# Patient Record
Sex: MALE | Race: WHITE | Employment: FULL TIME | URBAN - METROPOLITAN AREA
[De-identification: names, ages, dates, MRNs, and addresses within clinical notes are randomized per-mention and may not be internally consistent; named-entity substitution may affect disease eponyms.]

---

## 2017-05-02 PROBLEM — E11.9 CONTROLLED TYPE 2 DIABETES MELLITUS WITHOUT COMPLICATION, WITHOUT LONG-TERM CURRENT USE OF INSULIN (HCC): Status: ACTIVE | Noted: 2017-05-02

## 2018-10-22 PROBLEM — E66.01 SEVERE OBESITY (HCC): Status: ACTIVE | Noted: 2018-10-22

## 2020-12-07 ENCOUNTER — HOSPITAL ENCOUNTER (OUTPATIENT)
Dept: LAB | Age: 70
Discharge: HOME | End: 2020-12-07

## 2020-12-07 PROCEDURE — 88305 TISSUE EXAM BY PATHOLOGIST: CPT

## 2022-03-18 PROBLEM — E11.9 CONTROLLED TYPE 2 DIABETES MELLITUS WITHOUT COMPLICATION, WITHOUT LONG-TERM CURRENT USE OF INSULIN (HCC): Status: ACTIVE | Noted: 2017-05-02

## 2022-03-19 PROBLEM — E66.01 SEVERE OBESITY (HCC): Status: ACTIVE | Noted: 2018-10-22

## 2022-05-31 DIAGNOSIS — E78.2 MIXED HYPERLIPIDEMIA: ICD-10-CM

## 2022-05-31 DIAGNOSIS — E11.9 CONTROLLED TYPE 2 DIABETES MELLITUS WITHOUT COMPLICATION, WITHOUT LONG-TERM CURRENT USE OF INSULIN (HCC): Primary | ICD-10-CM

## 2022-05-31 RX ORDER — INSULIN GLARGINE 300 U/ML
INJECTION, SOLUTION SUBCUTANEOUS
Qty: 4.5 ML | Refills: 11 | OUTPATIENT
Start: 2022-05-31

## 2022-05-31 RX ORDER — PEN NEEDLE, DIABETIC 32GX 5/32"
NEEDLE, DISPOSABLE MISCELLANEOUS
Qty: 100 EACH | Refills: 11 | OUTPATIENT
Start: 2022-05-31

## 2022-06-01 RX ORDER — PEN NEEDLE, DIABETIC 32GX 5/32"
NEEDLE, DISPOSABLE MISCELLANEOUS
Qty: 100 EACH | Refills: 11 | OUTPATIENT
Start: 2022-06-01

## 2022-06-01 RX ORDER — INSULIN GLARGINE 300 U/ML
INJECTION, SOLUTION SUBCUTANEOUS
Qty: 4.5 ML | Refills: 11 | OUTPATIENT
Start: 2022-06-01

## 2022-06-02 RX ORDER — INSULIN GLARGINE 300 U/ML
INJECTION, SOLUTION SUBCUTANEOUS
Qty: 4.5 ML | Refills: 11 | OUTPATIENT
Start: 2022-06-02

## 2022-06-02 RX ORDER — SEMAGLUTIDE 1.34 MG/ML
INJECTION, SOLUTION SUBCUTANEOUS
Qty: 3 ML | Refills: 3 | OUTPATIENT
Start: 2022-06-02

## 2022-06-03 RX ORDER — SEMAGLUTIDE 1.34 MG/ML
INJECTION, SOLUTION SUBCUTANEOUS
Qty: 3 ML | Refills: 3 | OUTPATIENT
Start: 2022-06-03

## 2022-06-03 RX ORDER — INSULIN GLARGINE 300 U/ML
INJECTION, SOLUTION SUBCUTANEOUS
Qty: 4.5 ML | Refills: 11 | OUTPATIENT
Start: 2022-06-03

## 2022-06-16 RX ORDER — SEMAGLUTIDE 1.34 MG/ML
INJECTION, SOLUTION SUBCUTANEOUS
Qty: 3 ML | Refills: 9 | OUTPATIENT
Start: 2022-06-16

## 2022-06-17 ENCOUNTER — TELEPHONE (OUTPATIENT)
Dept: ENDOCRINOLOGY | Age: 72
End: 2022-06-17

## 2022-06-20 RX ORDER — SEMAGLUTIDE 1.34 MG/ML
1 INJECTION, SOLUTION SUBCUTANEOUS
Qty: 9 ML | Refills: 3 | Status: SHIPPED | OUTPATIENT
Start: 2022-06-20 | End: 2022-08-24 | Stop reason: DRUGHIGH

## 2022-06-21 RX ORDER — METFORMIN HYDROCHLORIDE 500 MG/1
TABLET, EXTENDED RELEASE ORAL
Qty: 120 TABLET | Refills: 11 | OUTPATIENT
Start: 2022-06-21

## 2022-06-21 RX ORDER — FENOFIBRATE 145 MG/1
TABLET, COATED ORAL
Qty: 30 TABLET | Refills: 11 | OUTPATIENT
Start: 2022-06-21

## 2022-08-18 LAB
AVERAGE GLUCOSE: ABNORMAL
HBA1C MFR BLD: 7 %

## 2022-08-24 ENCOUNTER — OFFICE VISIT (OUTPATIENT)
Dept: ENDOCRINOLOGY | Age: 72
End: 2022-08-24
Payer: MEDICARE

## 2022-08-24 VITALS
WEIGHT: 255 LBS | SYSTOLIC BLOOD PRESSURE: 140 MMHG | HEIGHT: 71 IN | HEART RATE: 71 BPM | DIASTOLIC BLOOD PRESSURE: 74 MMHG | OXYGEN SATURATION: 95 % | BODY MASS INDEX: 35.7 KG/M2

## 2022-08-24 DIAGNOSIS — E11.65 UNCONTROLLED TYPE 2 DIABETES MELLITUS WITH HYPERGLYCEMIA, WITH LONG-TERM CURRENT USE OF INSULIN (HCC): Primary | ICD-10-CM

## 2022-08-24 DIAGNOSIS — Z79.4 UNCONTROLLED TYPE 2 DIABETES MELLITUS WITH HYPERGLYCEMIA, WITH LONG-TERM CURRENT USE OF INSULIN (HCC): Primary | ICD-10-CM

## 2022-08-24 DIAGNOSIS — E78.2 MIXED HYPERLIPIDEMIA: ICD-10-CM

## 2022-08-24 PROCEDURE — G8419 CALC BMI OUT NRM PARAM NOF/U: HCPCS | Performed by: INTERNAL MEDICINE

## 2022-08-24 PROCEDURE — 99214 OFFICE O/P EST MOD 30 MIN: CPT | Performed by: INTERNAL MEDICINE

## 2022-08-24 PROCEDURE — 1123F ACP DISCUSS/DSCN MKR DOCD: CPT | Performed by: INTERNAL MEDICINE

## 2022-08-24 PROCEDURE — 4004F PT TOBACCO SCREEN RCVD TLK: CPT | Performed by: INTERNAL MEDICINE

## 2022-08-24 PROCEDURE — 3017F COLORECTAL CA SCREEN DOC REV: CPT | Performed by: INTERNAL MEDICINE

## 2022-08-24 PROCEDURE — 2022F DILAT RTA XM EVC RTNOPTHY: CPT | Performed by: INTERNAL MEDICINE

## 2022-08-24 PROCEDURE — G8427 DOCREV CUR MEDS BY ELIG CLIN: HCPCS | Performed by: INTERNAL MEDICINE

## 2022-08-24 PROCEDURE — 3051F HG A1C>EQUAL 7.0%<8.0%: CPT | Performed by: INTERNAL MEDICINE

## 2022-08-24 RX ORDER — GLIMEPIRIDE 1 MG/1
1 TABLET ORAL 2 TIMES DAILY WITH MEALS
Qty: 60 TABLET | Refills: 11 | Status: SHIPPED | OUTPATIENT
Start: 2022-08-24

## 2022-08-24 RX ORDER — LANCETS 30 GAUGE
EACH MISCELLANEOUS
Qty: 100 EACH | Refills: 11 | Status: SHIPPED | OUTPATIENT
Start: 2022-08-24

## 2022-08-24 RX ORDER — SEMAGLUTIDE 2.68 MG/ML
2 INJECTION, SOLUTION SUBCUTANEOUS WEEKLY
Qty: 3 ML | Refills: 11 | Status: SHIPPED | OUTPATIENT
Start: 2022-08-24

## 2022-08-24 RX ORDER — METFORMIN HYDROCHLORIDE 500 MG/1
1000 TABLET, EXTENDED RELEASE ORAL 2 TIMES DAILY WITH MEALS
Qty: 120 TABLET | Refills: 11 | Status: SHIPPED | OUTPATIENT
Start: 2022-08-24

## 2022-08-24 RX ORDER — FENOFIBRATE 145 MG/1
145 TABLET, COATED ORAL DAILY
Qty: 30 TABLET | Refills: 11 | Status: SHIPPED | OUTPATIENT
Start: 2022-08-24

## 2022-08-24 RX ORDER — PEN NEEDLE, DIABETIC 32GX 5/32"
NEEDLE, DISPOSABLE MISCELLANEOUS
Qty: 50 EACH | Refills: 11 | Status: SHIPPED | OUTPATIENT
Start: 2022-08-24

## 2022-08-24 RX ORDER — EZETIMIBE 10 MG/1
10 TABLET ORAL DAILY
Qty: 30 TABLET | Refills: 11 | Status: SHIPPED | OUTPATIENT
Start: 2022-08-24

## 2022-08-24 RX ORDER — CLINDAMYCIN HYDROCHLORIDE 150 MG/1
CAPSULE ORAL
COMMUNITY
Start: 2022-08-16

## 2022-08-24 RX ORDER — INSULIN GLARGINE 300 U/ML
40 INJECTION, SOLUTION SUBCUTANEOUS NIGHTLY
Qty: 4.5 ML | Refills: 11 | Status: SHIPPED | OUTPATIENT
Start: 2022-08-24

## 2022-08-24 RX ORDER — BLOOD SUGAR DIAGNOSTIC
STRIP MISCELLANEOUS
Qty: 100 EACH | Refills: 11 | Status: SHIPPED | OUTPATIENT
Start: 2022-08-24

## 2022-08-24 RX ORDER — ICOSAPENT ETHYL 1000 MG/1
2 CAPSULE ORAL 2 TIMES DAILY
Qty: 120 CAPSULE | Refills: 11 | Status: SHIPPED | OUTPATIENT
Start: 2022-08-24

## 2022-08-24 ASSESSMENT — ENCOUNTER SYMPTOMS: SHORTNESS OF BREATH: 1

## 2022-08-24 NOTE — PROGRESS NOTES
Angelito Oconnor MD, [facility]  [address], [address] [facility], [address] [place] [address]  Phone [phone]  Facsimile [phone]          Isha Lopez is a 81 y.o. male seen 8/24/2022 for follow-up of type 2 diabetes mellitus           ASSESSMENT AND PLAN:    1. Uncontrolled type 2 diabetes mellitus with hyperglycemia, with long-term current use of insulin (HCC)  His glycemic control is suboptimal and his hemoglobin A1c is not at goal less than 7. I will maximize his Ozempic as below. Eye exam negative 4/2022. Urine microalbumin negative 8/2022.      - TOUJEO SOLOSTAR 300 UNIT/ML SOPN; Inject 40 Units into the skin at bedtime  Dispense: 4.5 mL; Refill: 11  - OZEMPIC, 2 MG/DOSE, 8 MG/3ML SOPN; Inject 2 mg into the skin once a week  Dispense: 3 mL; Refill: 11  - metFORMIN (GLUCOPHAGE-XR) 500 MG extended release tablet; Take 2 tablets by mouth 2 times daily (with meals)  Dispense: 120 tablet; Refill: 11  - glimepiride (AMARYL) 1 MG tablet; Take 1 tablet by mouth 2 times daily (with meals)  Dispense: 60 tablet; Refill: 11  - BD PEN NEEDLE JESUS 2ND GEN 32G X 4 MM MISC; Once a day  Dispense: 50 each; Refill: 11  - ONETOUCH VERIO strip; 2 times a day  Dispense: 100 each; Refill: 11  - OneTouch Delica Lancets 41Y MISC; 2 times a day  Dispense: 100 each; Refill: 11    2. Mixed hyperlipidemia  He has been intolerant of multiple statins as outlined below. His insurance would not cover Nexletol or Nexlizet. His LDL is not at goal.  His triglycerides remain elevated despite fenofibrate, Vascepa and improved glycemic control.    - fenofibrate (TRICOR) 145 MG tablet; Take 1 tablet by mouth daily  Dispense: 30 tablet; Refill: 11  - VASCEPA 1 g CAPS capsule; Take 2 capsules by mouth 2 times daily  Dispense: 120 capsule; Refill: 11  - ezetimibe (ZETIA) 10 MG tablet; Take 1 tablet by mouth daily  Dispense: 30 tablet;  Refill: 11      Follow-up and Dispositions    Return in about 4 months (around 12/24/2022). HISTORY OF PRESENT ILLNESS:    DIABETES MELLITUS     Diagnosis: Type 2 diabetes mellitus diagnosed 2014. Diet: He eats a snack at night (carrots, peanuts). No sweet tea or regular soft drinks. He tries to limit carbohydrates. Exercise: No regular exercise but active at work. Monitoring: [employer] - 1-2 times per day. Blood Glucose: By recall: Fasting , evening 100-120. Hypoglycemia: No recent lows. Hemoglobin A1c:  3/11/2016: 7.8.  6/24/2016: 9.0.  9/30/2016: 9.8.  1/6/2017: 7.3.  4/6/2017: 6.7.  10/23/2017: 6.4.  4/19/2018: 7.1.  8/7/2018: 8.4.  10/22/2018: 8.5.  2/8/2019: 8.6.  5/14/2019: 8.5.  11/21/2019: 7.8.  9/15/2020: 6.5.  1/27/2021: 6.3.  5/21/2021: 6.9.  10/4/2021: 7.1.  2/14/2022: 6.8.  8/18/2022: 7.0. Microalbumin/Nephropathy:  3/11/2016: Creatinine 0.8 (GFR >90), urine microalbumin/creatinine 7.  6/24/2016: Creatinine 0.9 (GFR 85). 9/30/2016: Creatinine 0.9 (GFR 85). 1/6/2017: Creatinine 1.1 (GFR 67). 1/25/2017: Urine microalbumin/creatinine <8.8.  4/6/2017: Creatinine 1.1 (GFR 67), urine microalbumin/creatinine <5.  10/23/2017: Creatinine 1.1 (GFR 67).  4/19/2018: Creatinine 1.0 (GFR >60). 5/2/2018: Urine microalbumin/creatinine <4.9.  8/7/2018: Creatinine 0.9 (GFR 84). 2/8/2019: Creatinine 1.15 (GFR 66), urine microalbumin/creatinine 8.  3/26/2020: Urine microalbumin/creatinine 36.  9/15/2020: Creatinine 1.40 (GFR 51). 5/21/2021: Creatinine 1.40 (GFR 31), urine microalbumin/creatinine 9.  2/14/2022: Creatinine 1.33 (GFR 54). 8/18/2022: Creatinine 1.33 (GFR 58), urine albumin/creatinine 7. Neuropathy: Denies burning, numbness, tingling of feet. Retinopathy: Eye exam 4/2022 - no retinopathy. Lipids: Gemfibrozil stopped due to arthralgias. He was having some myalgias in his hips on pravastatin. His cardiologist changed him to atorvastatin. He is also taking fenofibrate.   His PCP changed him from atorvastatin to rosuvastatin due to myalgias. He stopped rosuvastatin due to cramps. His insurance would not cover Nexletol or Nexlizet. 3/11/2016: Total cholesterol 295, triglycerides 1618, HDL 35.   6/24/2016: Total cholesterol 238, triglycerides 399, HDL 35.  9/30/2016: Total cholesterol 259, triglycerides 684, HDL 42.  1/6/2017: Total cholesterol 209, triglycerides 330, HDL 45, LDL 98.  4/6/2017: Total cholesterol 194, triglycerides 390, HDL 41, LDL 75.  10/23/2017: Total cholesterol 162, triglycerides 264, HDL 41, LDL 68.  4/19/2018: Total cholesterol 172, triglycerides 232, HDL 40, LDL 86.  8/7/2018: Total cholesterol 157, triglycerides 284, HDL 41, LDL 59.  2/8/2019: Total cholesterol 154, triglycerides 194, HDL 41, LDL 74, VLDL 39.  5/14/2019: Total cholesterol 175, triglycerides 298, HDL 37, LDL 78, VLDL 60.  11/21/2019: Total cholesterol 160, triglycerides 352, HDL 44, LDL 46, VLDL 70.  9/15/2020: Total cholesterol 237, triglycerides 448, HDL 40.  5/21/2021: Total cholesterol 240, triglycerides 391, HDL 40, , VLDL 70.  10/4/2021: Total cholesterol 245, triglycerides 290, HDL 45, , VLDL 53.  2/14/2022: Total cholesterol 264, triglycerides 253, HDL 46, , VLDL 48.  8/18/2022: Total cholesterol 201, triglycerides 244, HDL 43, , VLDL 43. TSH:  3/11/2016: TSH 1.611.  5/21/2021: TSH 3.640.  8/18/2022: TSH 2.690. Prior treatment: He stopped [place] due to frequent urination and bladder spasms. Normal    Review of Systems   Constitutional:         Weight increased 6 pounds since last visit. Respiratory:  Positive for shortness of breath (occasionally). Cardiovascular:  Negative for chest pain.      Vital Signs:  BP (!) 140/74   Pulse 71   Ht 5' 11\" (1.803 m)   Wt 255 lb (115.7 kg)   SpO2 95%   BMI 35.57 kg/m²     Wt Readings from Last 3 Encounters:   08/24/22 255 lb (115.7 kg)   02/22/22 249 lb (112.9 kg)   05/27/21 258 lb (117 kg)       Physical

## 2022-12-19 ENCOUNTER — OFFICE VISIT (OUTPATIENT)
Dept: INTERNAL MEDICINE CLINIC | Facility: CLINIC | Age: 72
End: 2022-12-19
Payer: MEDICARE

## 2022-12-19 VITALS
TEMPERATURE: 97.2 F | HEIGHT: 71 IN | RESPIRATION RATE: 16 BRPM | OXYGEN SATURATION: 98 % | BODY MASS INDEX: 34.72 KG/M2 | DIASTOLIC BLOOD PRESSURE: 80 MMHG | HEART RATE: 63 BPM | SYSTOLIC BLOOD PRESSURE: 146 MMHG | WEIGHT: 248 LBS

## 2022-12-19 DIAGNOSIS — K42.9 UMBILICAL HERNIA WITHOUT OBSTRUCTION AND WITHOUT GANGRENE: Primary | ICD-10-CM

## 2022-12-19 DIAGNOSIS — E66.01 SEVERE OBESITY (HCC): ICD-10-CM

## 2022-12-19 DIAGNOSIS — R10.31 BILATERAL GROIN PAIN: ICD-10-CM

## 2022-12-19 DIAGNOSIS — M25.559 HIP PAIN: ICD-10-CM

## 2022-12-19 DIAGNOSIS — R20.0 BILATERAL HAND NUMBNESS: ICD-10-CM

## 2022-12-19 DIAGNOSIS — M62.08 DIASTASIS RECTI: ICD-10-CM

## 2022-12-19 DIAGNOSIS — E11.65 UNCONTROLLED TYPE 2 DIABETES MELLITUS WITH HYPERGLYCEMIA, WITHOUT LONG-TERM CURRENT USE OF INSULIN (HCC): ICD-10-CM

## 2022-12-19 DIAGNOSIS — I10 PRIMARY HYPERTENSION: ICD-10-CM

## 2022-12-19 DIAGNOSIS — R10.32 BILATERAL GROIN PAIN: ICD-10-CM

## 2022-12-19 DIAGNOSIS — E78.1 HYPERTRIGLYCERIDEMIA: ICD-10-CM

## 2022-12-19 PROCEDURE — 3051F HG A1C>EQUAL 7.0%<8.0%: CPT | Performed by: INTERNAL MEDICINE

## 2022-12-19 PROCEDURE — G8484 FLU IMMUNIZE NO ADMIN: HCPCS | Performed by: INTERNAL MEDICINE

## 2022-12-19 PROCEDURE — 3017F COLORECTAL CA SCREEN DOC REV: CPT | Performed by: INTERNAL MEDICINE

## 2022-12-19 PROCEDURE — 3077F SYST BP >= 140 MM HG: CPT | Performed by: INTERNAL MEDICINE

## 2022-12-19 PROCEDURE — 1123F ACP DISCUSS/DSCN MKR DOCD: CPT | Performed by: INTERNAL MEDICINE

## 2022-12-19 PROCEDURE — 1036F TOBACCO NON-USER: CPT | Performed by: INTERNAL MEDICINE

## 2022-12-19 PROCEDURE — 2022F DILAT RTA XM EVC RTNOPTHY: CPT | Performed by: INTERNAL MEDICINE

## 2022-12-19 PROCEDURE — G8417 CALC BMI ABV UP PARAM F/U: HCPCS | Performed by: INTERNAL MEDICINE

## 2022-12-19 PROCEDURE — 3078F DIAST BP <80 MM HG: CPT | Performed by: INTERNAL MEDICINE

## 2022-12-19 PROCEDURE — G8427 DOCREV CUR MEDS BY ELIG CLIN: HCPCS | Performed by: INTERNAL MEDICINE

## 2022-12-19 PROCEDURE — 99203 OFFICE O/P NEW LOW 30 MIN: CPT | Performed by: INTERNAL MEDICINE

## 2022-12-19 ASSESSMENT — PATIENT HEALTH QUESTIONNAIRE - PHQ9
SUM OF ALL RESPONSES TO PHQ QUESTIONS 1-9: 0
SUM OF ALL RESPONSES TO PHQ QUESTIONS 1-9: 0
SUM OF ALL RESPONSES TO PHQ9 QUESTIONS 1 & 2: 0
2. FEELING DOWN, DEPRESSED OR HOPELESS: 0
SUM OF ALL RESPONSES TO PHQ QUESTIONS 1-9: 0
1. LITTLE INTEREST OR PLEASURE IN DOING THINGS: 0
SUM OF ALL RESPONSES TO PHQ QUESTIONS 1-9: 0

## 2022-12-19 NOTE — PROGRESS NOTES
12/19/2022   Location:[facility] [address] [facility]  [place]  Patient #:  [id number]  YOB: 1950        History of Present Illness     Chief Complaint   Patient presents with    New Patient     Establishing care    Numbness     In hands    Fatigue    Hernia     X 4 year worse in the past year    Abdominal Pain     Right flank happen while standing still for a long period of time. Sitting will resolve the pain       Mr. Adán Fish is a 81 y.o. male  who presents for visit to establish care. Chronic active medical issues DM, HTN, HLD reports tolerance to meds and compliance. He main complaint today is growing umbilical hernia . He does report hearing gurgling in the hernia. There is no pain. Is never firm. He also reports episodes of right flank pain after standing for a long period of time. Only happens when he is standing in 1 place. Does not happen with activity. There is no pain with pressure. Feels like a spasm and he has to go sit down in in a matter of minutes it will resolve. He can feel a tight knot in the muscle. Denies any nausea vomiting or any other associated symptoms. Denies any trauma to the area. Complains of pain in his groin with long ambulation. He is under the care of endocrinologist for his diabetes.    He is under the care of pulmonologist for SREE  Last Labs  CBC: No results found for: WBC, RBC, HGB, HCT, MCV, MCH, MCHC, RDW, PLT, MPV  CMP:    Lab Results   Component Value Date/Time     02/14/2022 11:14 AM    K 5.3 02/14/2022 11:14 AM     02/14/2022 11:14 AM    CO2 22 02/14/2022 11:14 AM    BUN 21 02/14/2022 11:14 AM    CREATININE 1.33 02/14/2022 11:14 AM    GFRAA 63 02/14/2022 11:14 AM    AGRATIO 2.4 02/14/2022 11:14 AM    GLUCOSE 101 02/14/2022 11:14 AM    PROT 6.8 02/14/2022 11:14 AM    LABALBU 4.8 02/14/2022 11:14 AM    CALCIUM 9.4 02/14/2022 11:14 AM    BILITOT 0.4 02/14/2022 11:14 AM    ALKPHOS 57 02/14/2022 11:14 AM AST 19 02/14/2022 11:14 AM    ALT 27 02/14/2022 11:14 AM     HgBA1c:    Lab Results   Component Value Date/Time    LABA1C 7.0 08/18/2022 12:00 AM     Microalbumen/Creatinine ratio:  No components found for: RUCREAT  FLP:    Lab Results   Component Value Date/Time    TRIG 253 02/14/2022 11:14 AM    HDL 46 02/14/2022 11:14 AM    LDLCALC 170 02/14/2022 11:14 AM     TSH:    Lab Results   Component Value Date/Time    TSH 3.640 05/21/2021 08:55 AM       Allergies   Allergen Reactions    Penicillins Hives     Past Medical History:   Diagnosis Date    Chest pain 3/28/2016    Arm heaviness with ambulation     Dyspnea     Hyperlipidemia     Hypertriglyceridemia 7/21/2016    Myalgia 4/25/2016    Sleep apnea     Type 2 diabetes mellitus ([facility] [address]      Social History     Socioeconomic History    Marital status:      Spouse name: None    Number of children: None    Years of education: None    Highest education level: None   Tobacco Use    Smoking status: Never    Smokeless tobacco: Never   Vaping Use    Vaping Use: Never used   Substance and Sexual Activity    Alcohol use: No    Drug use: Never     Past Surgical History:   Procedure Laterality Date    COLONOSCOPY  2019    LITHOTRIPSY  11/2018    TONSILLECTOMY AND ADENOIDECTOMY      WISDOM TOOTH EXTRACTION       Family History   Problem Relation Age of Onset    Cancer Mother     Dementia Mother     Cancer Father     Dementia Father     Coronary Art Dis Paternal Aunt 72    Heart Disease Paternal Uncle     Diabetes Paternal Uncle     Hypertension Other     Stroke Other      Current Outpatient Medications   Medication Sig Dispense Refill    Misc Natural Products (NEURIVA PO) Take by mouth      TOUJEO SOLOSTAR 300 UNIT/ML SOPN Inject 40 Units into the skin at bedtime 4.5 mL 11    OZEMPIC, 2 MG/DOSE, 8 MG/3ML SOPN Inject 2 mg into the skin once a week 3 mL 11    metFORMIN (GLUCOPHAGE-XR) 500 MG extended release tablet Take 2 tablets by mouth 2 times daily (with meals) 120 tablet 11    glimepiride (AMARYL) 1 MG tablet Take 1 tablet by mouth 2 times daily (with meals) 60 tablet 11    BD PEN NEEDLE JESUS 2ND GEN 32G X 4 MM MISC Once a day 50 each 11    ONETOUCH VERIO strip 2 times a day 100 each 11    OneTouch Delica Lancets 69P MISC 2 times a day 100 each 11    fenofibrate (TRICOR) 145 MG tablet Take 1 tablet by mouth daily 30 tablet 11    VASCEPA 1 g CAPS capsule Take 2 capsules by mouth 2 times daily 120 capsule 11    ezetimibe (ZETIA) 10 MG tablet Take 1 tablet by mouth daily 30 tablet 11    GARLIC PO Take by mouth      coenzyme Q10 100 MG CAPS capsule Take 200 mg by mouth daily       No current facility-administered medications for this visit. Health Maintenance   Topic Date Due    Pneumococcal 65+ years Vaccine (1 - PCV) Never done    Diabetic foot exam  Never done    Depression Screen  Never done    Hepatitis C screen  Never done    Colorectal Cancer Screen  Never done    Shingles vaccine (1 of 2) Never done    COVID-19 Vaccine (3 - Booster for Amy Carwin series) 05/22/2021    Annual Wellness Visit (AWV)  Never done    Flu vaccine (1) Never done    Diabetic retinal exam  04/11/2023    A1C test (Diabetic or Prediabetic)  08/18/2023    Diabetic microalbuminuria test  08/18/2023    Lipids  08/18/2023    DTaP/Tdap/Td vaccine (2 - Td or Tdap) 08/18/2028    Hepatitis A vaccine  Aged Out    Hib vaccine  Aged Out    Meningococcal (ACWY) vaccine  Aged Out             Review of Systems  Review of Systems   Constitutional:  Negative for fever. Eyes:  Negative for visual disturbance. Respiratory:  Negative for cough and shortness of breath. Gastrointestinal:  Negative for abdominal pain, blood in stool, constipation and diarrhea. Genitourinary:  Negative for difficulty urinating. Musculoskeletal:  Positive for arthralgias and myalgias. Neurological:  Positive for numbness (hands). Negative for weakness and headaches.      BP (!) 151/81 (Site: Left Upper Arm, Position: Sitting)   Pulse 63   Temp 97.2 °F (36.2 °C) (Temporal)   Resp 16   Ht 5' 11\" (1.803 m)   Wt 248 lb (112.5 kg)   SpO2 98%   BMI 34.59 kg/m²     Wt Readings from Last 3 Encounters:   12/19/22 248 lb (112.5 kg)   08/24/22 255 lb (115.7 kg)   02/22/22 249 lb (112.9 kg)       Physical Exam    Physical Exam  Vitals and nursing note reviewed. Constitutional:       Appearance: Normal appearance. He is obese. HENT:      Head: Atraumatic. Mouth/Throat:      Mouth: Mucous membranes are moist.   Eyes:      Extraocular Movements: Extraocular movements intact. Conjunctiva/sclera: Conjunctivae normal.      Pupils: Pupils are equal, round, and reactive to light. Neck:      Vascular: No carotid bruit. Cardiovascular:      Rate and Rhythm: Normal rate and regular rhythm. Heart sounds: Normal heart sounds. Pulmonary:      Effort: Pulmonary effort is normal.      Breath sounds: Normal breath sounds. Abdominal:      General: Bowel sounds are normal.      Palpations: Abdomen is soft. Tenderness: There is no abdominal tenderness. Hernia: A hernia is present. Hernia is present in the umbilical area. Comments: Diastasis recti with umbilical hernia. Hernia is reducible with audible BS   Musculoskeletal:      Right hip: Tenderness present. Decreased range of motion. Normal strength. Left hip: Tenderness present. Decreased range of motion. Normal strength. Feet:      Comments: Diabetic foot exam:   Left Foot:   Visual Exam: callous-    Pulse DP: 2+ (normal)   Filament test: reduced sensation   Vibratory Sensation: diminished  Right Foot:   Visual Exam: callous-      Pulse DP: 2+ (normal)   Filament test: reduced sensation   Vibratory Sensation: normal    Lymphadenopathy:      Cervical: No cervical adenopathy. Skin:     General: Skin is warm and dry. Neurological:      Mental Status: He is alert and oriented to person, place, and time. Cranial Nerves: No cranial nerve deficit.       Gait: Gait normal.   Psychiatric:         Mood and Affect: Mood normal.           Assessment & Plan    Encounter Diagnoses   Name Primary? Umbilical hernia without obstruction and without gangrene Yes    Severe obesity (HCC)     Hypertriglyceridemia     Bilateral hand numbness     Bilateral groin pain     Hip pain     Diastasis recti        No orders of the defined types were placed in this encounter. Orders Placed This Encounter   Procedures    XR HIP LEFT (2-3 VIEWS)     Standing Status:   Future     Standing Expiration Date:   12/19/2023     Order Specific Question:   Reason for exam:     Answer:   groin pain with walking    XR HIP RIGHT (2-3 VIEWS)     Standing Status:   Future     Standing Expiration Date:   12/19/2023     Order Specific Question:   Reason for exam:     Answer:   groin pain when  walking    [address] [facility], [address] [place] [address]     Referral Priority:   Routine     Referral Type:   Eval and Treat     Referral Reason:   Specialty Services Required     Requested Specialty:   General Surgery     Number of Visits Requested:   1       The patient is asked to make an attempt to improve diet and exercise patterns to aid in medical management of this problem. Monitor BP at home. Check xray of hps likely OA  Discussed having NCS to evaluate for CTS. Return in about 6 weeks (around 1/30/2023) for re evaluation of hypertension, fasting labs.         Jb Long MD

## 2022-12-22 ENCOUNTER — PREP FOR PROCEDURE (OUTPATIENT)
Dept: SURGERY | Age: 72
End: 2022-12-22

## 2022-12-22 ENCOUNTER — OFFICE VISIT (OUTPATIENT)
Dept: SURGERY | Age: 72
End: 2022-12-22
Payer: MEDICARE

## 2022-12-22 ENCOUNTER — TELEPHONE (OUTPATIENT)
Dept: INTERNAL MEDICINE CLINIC | Facility: CLINIC | Age: 72
End: 2022-12-22

## 2022-12-22 ENCOUNTER — HOSPITAL ENCOUNTER (OUTPATIENT)
Dept: GENERAL RADIOLOGY | Age: 72
Discharge: HOME | End: 2022-12-22
Payer: MEDICARE

## 2022-12-22 VITALS — HEIGHT: 71 IN | BODY MASS INDEX: 35 KG/M2 | WEIGHT: 250 LBS

## 2022-12-22 DIAGNOSIS — R10.32 BILATERAL GROIN PAIN: ICD-10-CM

## 2022-12-22 DIAGNOSIS — R10.31 BILATERAL GROIN PAIN: ICD-10-CM

## 2022-12-22 DIAGNOSIS — K42.9 UMBILICAL HERNIA WITHOUT OBSTRUCTION AND WITHOUT GANGRENE: ICD-10-CM

## 2022-12-22 DIAGNOSIS — M25.559 HIP PAIN: ICD-10-CM

## 2022-12-22 PROCEDURE — 73521 X-RAY EXAM HIPS BI 2 VIEWS: CPT

## 2022-12-22 PROCEDURE — 1123F ACP DISCUSS/DSCN MKR DOCD: CPT | Performed by: SURGERY

## 2022-12-22 PROCEDURE — 99202 OFFICE O/P NEW SF 15 MIN: CPT | Performed by: SURGERY

## 2022-12-22 PROCEDURE — 1036F TOBACCO NON-USER: CPT | Performed by: SURGERY

## 2022-12-22 PROCEDURE — G8484 FLU IMMUNIZE NO ADMIN: HCPCS | Performed by: SURGERY

## 2022-12-22 PROCEDURE — G8417 CALC BMI ABV UP PARAM F/U: HCPCS | Performed by: SURGERY

## 2022-12-22 PROCEDURE — 3017F COLORECTAL CA SCREEN DOC REV: CPT | Performed by: SURGERY

## 2022-12-22 PROCEDURE — G8427 DOCREV CUR MEDS BY ELIG CLIN: HCPCS | Performed by: SURGERY

## 2022-12-22 ASSESSMENT — ENCOUNTER SYMPTOMS
BLOOD IN STOOL: 0
CONSTIPATION: 0
ABDOMINAL PAIN: 0
DIARRHEA: 0
COUGH: 0
SHORTNESS OF BREATH: 0

## 2022-12-22 NOTE — PROGRESS NOTES
[facility]  [facility]. [address] [place] [address]  [phone]    Office Note/H&P/Consult Note   Maximiliano Lu   MRN: [id number]     : 1950        HPI: Maximiliano Lu is a 81 y.o. male who is here to see me today with an umbilical hernia. This is been present for many years. It is increasing in size. It causes him no pain whatsoever. No change in bowel habits. No previous surgery in this area. It is becoming unsightly and getting in the way of his clothes etc. so he would like to have this repaired. Past Medical History:   Diagnosis Date    Chest pain 3/28/2016    Arm heaviness with ambulation     Dyspnea     Hyperlipidemia     Hypertriglyceridemia 2016    Myalgia 2016    Sleep apnea     Type 2 diabetes mellitus [facility])      Past Surgical History:   Procedure Laterality Date    COLONOSCOPY  2019    LITHOTRIPSY  2018    TONSILLECTOMY AND ADENOIDECTOMY      WISDOM TOOTH EXTRACTION       Current Outpatient Medications   Medication Sig    Misc Natural Products (NEURIVA PO) Take by mouth    TOUJEO SOLOSTAR 300 UNIT/ML SOPN Inject 40 Units into the skin at bedtime    OZEMPIC, 2 MG/DOSE, 8 MG/3ML SOPN Inject 2 mg into the skin once a week    metFORMIN (GLUCOPHAGE-XR) 500 MG extended release tablet Take 2 tablets by mouth 2 times daily (with meals)    glimepiride (AMARYL) 1 MG tablet Take 1 tablet by mouth 2 times daily (with meals)    BD PEN NEEDLE JESUS 2ND GEN 32G X 4 MM MISC Once a day    ONETOUCH VERIO strip 2 times a day    OneTouch Delica Lancets 19N MISC 2 times a day    fenofibrate (TRICOR) 145 MG tablet Take 1 tablet by mouth daily    VASCEPA 1 g CAPS capsule Take 2 capsules by mouth 2 times daily    ezetimibe (ZETIA) 10 MG tablet Take 1 tablet by mouth daily    GARLIC PO Take by mouth    coenzyme Q10 100 MG CAPS capsule Take 200 mg by mouth daily     No current facility-administered medications for this visit.      ALLERGIES: Penicillins    Social History     Socioeconomic History    Marital status:      Spouse name: None    Number of children: None    Years of education: None    Highest education level: None   Tobacco Use    Smoking status: Never    Smokeless tobacco: Never   Vaping Use    Vaping Use: Never used   Substance and Sexual Activity    Alcohol use: No    Drug use: Never     Social History     Tobacco Use   Smoking Status Never   Smokeless Tobacco Never     Family History   Problem Relation Age of Onset    Cancer Mother     Dementia Mother     Cancer Father     Dementia Father     Coronary Art Dis Paternal Aunt 72    Heart Disease Paternal Uncle     Diabetes Paternal Uncle     Hypertension Other     Stroke Other        ROS: The patient has no difficulty with chest pain or shortness of breath. No fever or chills. Comprehensive review of systems was otherwise unremarkable except as noted above. Physical Exam:   Constitutional: Alert oriented cooperative patient in no acute distress. Ht 5' 11\" (1.803 m)   Wt 250 lb (113.4 kg)   BMI 34.87 kg/m²   Eyes:Sclera are clear, pupils symmetric   ENMT: ears have no obvious external lesions; no obvious neck masses; no lip lesions  CV: RRR. Normal perfusion; no embolic signs  Resp: No JVD. Breathing is  non-labored. No audible wheezing   GI: soft and non-distended. Large umbilical hernia is present which is reducible. Musculoskeletal: no significant asymmetry; normal tone; unremarkable with normal function. Neuro:  No obvious focal deficits; moves all 4; A&O x3  Psychiatric: normal affect and mood, no memory impairment      Labs:  Lab Results   Component Value Date/Time     02/14/2022 11:14 AM    K 5.3 02/14/2022 11:14 AM     02/14/2022 11:14 AM    CO2 22 02/14/2022 11:14 AM    BUN 21 02/14/2022 11:14 AM    ALT 27 02/14/2022 11:14 AM       No results found for this or any previous visit.     I reviewed patient's medications, labs, and independently reviewed relevant radiologic studies if available. Labs/radiology studies as ordered in [facility] or in scanned in media tab if pt is pre-op. Amt of data reviewed moderate-extensive. I spent 20 minutes with him this morning greater than 50% this time was spent in counseling. Assessment/Plan:     )Nadya Carlton is a 81 y.o. male who has signs and symptoms consistent with the below issues:  Umbilical hernia. This does need to be repaired. I have gone over the procedure with him. Risk discussed include bleeding, infection, bowel injury and recurrence. With the size of this I believe that this will be dose done robotically. He will need mesh and he is okay with this. We have discussed his weight. He has a extremely large abdomen and he is at a very high risk for recurrence. Also healing and infection may be an issue as he is a type II diabetic. He understands all of this. He would like to go forward. Proceed with robotic umbilical hernia repair with mesh.           Avani Alejo MD,  FACS

## 2022-12-22 NOTE — TELEPHONE ENCOUNTER
----- Message from Yasmin Trotter MD sent at 12/22/2022  1:22 PM EST -----  Please call and notify patient:   He has mild bilateral hip arthritis. Try taking Acetaminophen 2 to 3 times a day as needed. Definitely take 2 tablets prior to strenuous activity.   Yasmin Trotter MD

## 2022-12-23 RX ORDER — SODIUM CHLORIDE 0.9 % (FLUSH) 0.9 %
5-40 SYRINGE (ML) INJECTION PRN
Status: CANCELLED | OUTPATIENT
Start: 2022-12-23

## 2022-12-23 RX ORDER — SODIUM CHLORIDE 9 MG/ML
INJECTION, SOLUTION INTRAVENOUS PRN
Status: CANCELLED | OUTPATIENT
Start: 2022-12-23

## 2022-12-23 RX ORDER — SODIUM CHLORIDE 0.9 % (FLUSH) 0.9 %
5-40 SYRINGE (ML) INJECTION EVERY 12 HOURS SCHEDULED
Status: CANCELLED | OUTPATIENT
Start: 2022-12-23

## 2023-01-13 NOTE — PERIOP NOTE
Patient verified name and . Order for consent was found in EHR and matches case posting; patient verifies procedure. Type II surgery, phone assessment complete. Orders were received. Labs per surgeon: PAT Protocol  Labs per anesthesia [address] Glucose DOS  Patient coming to [facility] PAT for EKG 23 at 1100, chart flagged for f/u     Patient answered medical/surgical history questions at their best of ability. All prior to admission medications documented in [facility]. Patient instructed to take the following medications the day of surgery according to anesthesia guidelines with a small sip of water: none     Per anesthesia protocol: Toujeo Solostar-Take 80% of usual dose evening before procedure. Adjusted dose = 32 units. On the day before surgery please take Acetaminophen 1000mg in the morning and then again before bed. You may substitute for Tylenol 650 mg. Hold all vitamins 7 days prior to surgery and NSAIDS 5 days prior to surgery. Prescription meds to hold Morning of surgery:glimepiride (Amaryl), Metformin (Glucophage), Ezetimibe (Zetia), Fenofibrate (Tricor), Vascepa,   Patient instructed on the following:    > Arrive at [facility], time of arrival to be called the day before by 1700  > NPO after midnight, unless otherwise indicated, including gum, mints, and ice chips  > Responsible adult must drive patient to the hospital, stay during surgery, and patient will need supervision 24 hours after anesthesia  > Use antibacterial soap in shower the night before surgery and on the morning of surgery  > All piercings must be removed prior to arrival.    > Leave all valuables (money and jewelry) at home but bring insurance card and ID on DOS.   > You may be required to pay a deductible or co-pay on the day of your procedure. You can pre-pay by calling [phone] if your surgery is at the [facility] or [phone] if your surgery is at the [facility].   > Do not wear make-up, nail polish, lotions, cologne, perfumes, powders, or oil on skin. Artificial nails are not permitted.

## 2023-01-17 ENCOUNTER — HOSPITAL ENCOUNTER (OUTPATIENT)
Dept: PREADMISSION TESTING | Age: 73
Discharge: HOME | End: 2023-01-20
Payer: MEDICARE

## 2023-01-17 LAB
EKG ATRIAL RATE: 95 BPM
EKG DIAGNOSIS: NORMAL
EKG P AXIS: 33 DEGREES
EKG P-R INTERVAL: 200 MS
EKG Q-T INTERVAL: 350 MS
EKG QRS DURATION: 82 MS
EKG QTC CALCULATION (BAZETT): 439 MS
EKG R AXIS: 28 DEGREES
EKG T AXIS: 57 DEGREES
EKG VENTRICULAR RATE: 95 BPM

## 2023-01-17 PROCEDURE — 93005 ELECTROCARDIOGRAM TRACING: CPT | Performed by: STUDENT IN AN ORGANIZED HEALTH CARE EDUCATION/TRAINING PROGRAM

## 2023-01-19 ENCOUNTER — ANESTHESIA EVENT (OUTPATIENT)
Dept: SURGERY | Age: 73
End: 2023-01-19
Payer: MEDICARE

## 2023-01-20 ENCOUNTER — HOSPITAL ENCOUNTER (OUTPATIENT)
Age: 73
Setting detail: OUTPATIENT SURGERY
Discharge: HOME | End: 2023-01-20
Attending: SURGERY | Admitting: SURGERY
Payer: MEDICARE

## 2023-01-20 ENCOUNTER — ANESTHESIA (OUTPATIENT)
Dept: SURGERY | Age: 73
End: 2023-01-20
Payer: MEDICARE

## 2023-01-20 VITALS
SYSTOLIC BLOOD PRESSURE: 154 MMHG | TEMPERATURE: 97.7 F | RESPIRATION RATE: 18 BRPM | OXYGEN SATURATION: 90 % | WEIGHT: 247 LBS | HEIGHT: 71 IN | HEART RATE: 80 BPM | DIASTOLIC BLOOD PRESSURE: 79 MMHG | BODY MASS INDEX: 34.58 KG/M2

## 2023-01-20 DIAGNOSIS — K42.9 UMBILICAL HERNIA WITHOUT OBSTRUCTION AND WITHOUT GANGRENE: Primary | ICD-10-CM

## 2023-01-20 LAB
GLUCOSE BLD STRIP.AUTO-MCNC: 104 MG/DL (ref 65–100)
SERVICE CMNT-IMP: ABNORMAL

## 2023-01-20 PROCEDURE — 2709999900 HC NON-CHARGEABLE SUPPLY: Performed by: SURGERY

## 2023-01-20 PROCEDURE — C1781 MESH (IMPLANTABLE): HCPCS | Performed by: SURGERY

## 2023-01-20 PROCEDURE — 7100000011 HC PHASE II RECOVERY - ADDTL 15 MIN: Performed by: SURGERY

## 2023-01-20 PROCEDURE — 3700000000 HC ANESTHESIA ATTENDED CARE: Performed by: SURGERY

## 2023-01-20 PROCEDURE — 6360000002 HC RX W HCPCS: Performed by: SURGERY

## 2023-01-20 PROCEDURE — 2500000003 HC RX 250 WO HCPCS: Performed by: NURSE ANESTHETIST, CERTIFIED REGISTERED

## 2023-01-20 PROCEDURE — 2580000003 HC RX 258: Performed by: ANESTHESIOLOGY

## 2023-01-20 PROCEDURE — 7100000010 HC PHASE II RECOVERY - FIRST 15 MIN: Performed by: SURGERY

## 2023-01-20 PROCEDURE — 3700000001 HC ADD 15 MINUTES (ANESTHESIA): Performed by: SURGERY

## 2023-01-20 PROCEDURE — 7100000000 HC PACU RECOVERY - FIRST 15 MIN: Performed by: SURGERY

## 2023-01-20 PROCEDURE — 3600000009 HC SURGERY ROBOT BASE: Performed by: SURGERY

## 2023-01-20 PROCEDURE — 82962 GLUCOSE BLOOD TEST: CPT

## 2023-01-20 PROCEDURE — 7100000001 HC PACU RECOVERY - ADDTL 15 MIN: Performed by: SURGERY

## 2023-01-20 PROCEDURE — 6360000002 HC RX W HCPCS: Performed by: NURSE ANESTHETIST, CERTIFIED REGISTERED

## 2023-01-20 PROCEDURE — 2500000003 HC RX 250 WO HCPCS: Performed by: SURGERY

## 2023-01-20 PROCEDURE — S2900 ROBOTIC SURGICAL SYSTEM: HCPCS | Performed by: SURGERY

## 2023-01-20 PROCEDURE — 3600000019 HC SURGERY ROBOT ADDTL 15MIN: Performed by: SURGERY

## 2023-01-20 DEVICE — MESH SURG DIA4.5IN CIR SEPRA TECHNOLOGY VENTRALIGHT: Type: IMPLANTABLE DEVICE | Status: FUNCTIONAL

## 2023-01-20 RX ORDER — PROPOFOL 10 MG/ML
INJECTION, EMULSION INTRAVENOUS PRN
Status: DISCONTINUED | OUTPATIENT
Start: 2023-01-20 | End: 2023-01-20 | Stop reason: SDUPTHER

## 2023-01-20 RX ORDER — DEXAMETHASONE SODIUM PHOSPHATE 4 MG/ML
INJECTION, SOLUTION INTRA-ARTICULAR; INTRALESIONAL; INTRAMUSCULAR; INTRAVENOUS; SOFT TISSUE PRN
Status: DISCONTINUED | OUTPATIENT
Start: 2023-01-20 | End: 2023-01-20 | Stop reason: SDUPTHER

## 2023-01-20 RX ORDER — OXYCODONE HYDROCHLORIDE AND ACETAMINOPHEN 5; 325 MG/1; MG/1
1 TABLET ORAL EVERY 6 HOURS PRN
Qty: 20 TABLET | Refills: 0 | Status: SHIPPED | OUTPATIENT
Start: 2023-01-20 | End: 2023-01-25

## 2023-01-20 RX ORDER — SODIUM CHLORIDE 0.9 % (FLUSH) 0.9 %
5-40 SYRINGE (ML) INJECTION EVERY 12 HOURS SCHEDULED
Status: DISCONTINUED | OUTPATIENT
Start: 2023-01-20 | End: 2023-01-20 | Stop reason: HOSPADM

## 2023-01-20 RX ORDER — LIDOCAINE HYDROCHLORIDE 10 MG/ML
1 INJECTION, SOLUTION INFILTRATION; PERINEURAL
Status: DISCONTINUED | OUTPATIENT
Start: 2023-01-20 | End: 2023-01-20 | Stop reason: HOSPADM

## 2023-01-20 RX ORDER — HALOPERIDOL 5 MG/ML
1 INJECTION INTRAMUSCULAR
Status: DISCONTINUED | OUTPATIENT
Start: 2023-01-20 | End: 2023-01-20 | Stop reason: HOSPADM

## 2023-01-20 RX ORDER — DEXTROSE MONOHYDRATE 100 MG/ML
INJECTION, SOLUTION INTRAVENOUS CONTINUOUS PRN
Status: DISCONTINUED | OUTPATIENT
Start: 2023-01-20 | End: 2023-01-20 | Stop reason: HOSPADM

## 2023-01-20 RX ORDER — SODIUM CHLORIDE 0.9 % (FLUSH) 0.9 %
5-40 SYRINGE (ML) INJECTION PRN
Status: DISCONTINUED | OUTPATIENT
Start: 2023-01-20 | End: 2023-01-20 | Stop reason: HOSPADM

## 2023-01-20 RX ORDER — LIDOCAINE HYDROCHLORIDE 20 MG/ML
INJECTION, SOLUTION EPIDURAL; INFILTRATION; INTRACAUDAL; PERINEURAL PRN
Status: DISCONTINUED | OUTPATIENT
Start: 2023-01-20 | End: 2023-01-20 | Stop reason: SDUPTHER

## 2023-01-20 RX ORDER — FENTANYL CITRATE 50 UG/ML
INJECTION, SOLUTION INTRAMUSCULAR; INTRAVENOUS PRN
Status: DISCONTINUED | OUTPATIENT
Start: 2023-01-20 | End: 2023-01-20 | Stop reason: SDUPTHER

## 2023-01-20 RX ORDER — SODIUM CHLORIDE, SODIUM LACTATE, POTASSIUM CHLORIDE, CALCIUM CHLORIDE 600; 310; 30; 20 MG/100ML; MG/100ML; MG/100ML; MG/100ML
INJECTION, SOLUTION INTRAVENOUS CONTINUOUS
Status: DISCONTINUED | OUTPATIENT
Start: 2023-01-20 | End: 2023-01-20 | Stop reason: HOSPADM

## 2023-01-20 RX ORDER — GLYCOPYRROLATE 0.2 MG/ML
INJECTION INTRAMUSCULAR; INTRAVENOUS PRN
Status: DISCONTINUED | OUTPATIENT
Start: 2023-01-20 | End: 2023-01-20 | Stop reason: SDUPTHER

## 2023-01-20 RX ORDER — OXYCODONE HYDROCHLORIDE 5 MG/1
5 TABLET ORAL
Status: DISCONTINUED | OUTPATIENT
Start: 2023-01-20 | End: 2023-01-20 | Stop reason: HOSPADM

## 2023-01-20 RX ORDER — ONDANSETRON 2 MG/ML
INJECTION INTRAMUSCULAR; INTRAVENOUS PRN
Status: DISCONTINUED | OUTPATIENT
Start: 2023-01-20 | End: 2023-01-20 | Stop reason: SDUPTHER

## 2023-01-20 RX ORDER — SODIUM CHLORIDE 9 MG/ML
INJECTION, SOLUTION INTRAVENOUS PRN
Status: DISCONTINUED | OUTPATIENT
Start: 2023-01-20 | End: 2023-01-20 | Stop reason: HOSPADM

## 2023-01-20 RX ORDER — MIDAZOLAM HYDROCHLORIDE 2 MG/2ML
2 INJECTION, SOLUTION INTRAMUSCULAR; INTRAVENOUS
Status: DISCONTINUED | OUTPATIENT
Start: 2023-01-20 | End: 2023-01-20 | Stop reason: HOSPADM

## 2023-01-20 RX ORDER — ROCURONIUM BROMIDE 10 MG/ML
INJECTION, SOLUTION INTRAVENOUS PRN
Status: DISCONTINUED | OUTPATIENT
Start: 2023-01-20 | End: 2023-01-20 | Stop reason: SDUPTHER

## 2023-01-20 RX ORDER — HYDROMORPHONE HCL 110MG/55ML
0.25 PATIENT CONTROLLED ANALGESIA SYRINGE INTRAVENOUS EVERY 5 MIN PRN
Status: DISCONTINUED | OUTPATIENT
Start: 2023-01-20 | End: 2023-01-20 | Stop reason: HOSPADM

## 2023-01-20 RX ORDER — ACETAMINOPHEN 500 MG
1000 TABLET ORAL ONCE
Status: DISCONTINUED | OUTPATIENT
Start: 2023-01-20 | End: 2023-01-20 | Stop reason: HOSPADM

## 2023-01-20 RX ORDER — SUCCINYLCHOLINE CHLORIDE 20 MG/ML
INJECTION INTRAMUSCULAR; INTRAVENOUS PRN
Status: DISCONTINUED | OUTPATIENT
Start: 2023-01-20 | End: 2023-01-20 | Stop reason: SDUPTHER

## 2023-01-20 RX ORDER — ONDANSETRON 2 MG/ML
4 INJECTION INTRAMUSCULAR; INTRAVENOUS
Status: DISCONTINUED | OUTPATIENT
Start: 2023-01-20 | End: 2023-01-20 | Stop reason: HOSPADM

## 2023-01-20 RX ORDER — NEOSTIGMINE METHYLSULFATE 1 MG/ML
INJECTION, SOLUTION INTRAVENOUS PRN
Status: DISCONTINUED | OUTPATIENT
Start: 2023-01-20 | End: 2023-01-20 | Stop reason: SDUPTHER

## 2023-01-20 RX ORDER — BUPIVACAINE HYDROCHLORIDE 5 MG/ML
INJECTION, SOLUTION EPIDURAL; INTRACAUDAL PRN
Status: DISCONTINUED | OUTPATIENT
Start: 2023-01-20 | End: 2023-01-20 | Stop reason: HOSPADM

## 2023-01-20 RX ADMIN — SODIUM CHLORIDE, SODIUM LACTATE, POTASSIUM CHLORIDE, AND CALCIUM CHLORIDE: 600; 310; 30; 20 INJECTION, SOLUTION INTRAVENOUS at 11:15

## 2023-01-20 RX ADMIN — LIDOCAINE HYDROCHLORIDE 100 MG: 20 INJECTION, SOLUTION EPIDURAL; INFILTRATION; INTRACAUDAL; PERINEURAL at 10:33

## 2023-01-20 RX ADMIN — ROCURONIUM BROMIDE 50 MG: 50 INJECTION INTRAVENOUS at 10:37

## 2023-01-20 RX ADMIN — ONDANSETRON 4 MG: 2 INJECTION INTRAMUSCULAR; INTRAVENOUS at 10:58

## 2023-01-20 RX ADMIN — FENTANYL CITRATE 50 MCG: 50 INJECTION, SOLUTION INTRAMUSCULAR; INTRAVENOUS at 11:20

## 2023-01-20 RX ADMIN — FENTANYL CITRATE 50 MCG: 50 INJECTION, SOLUTION INTRAMUSCULAR; INTRAVENOUS at 10:29

## 2023-01-20 RX ADMIN — ROCURONIUM BROMIDE 10 MG: 50 INJECTION INTRAVENOUS at 11:35

## 2023-01-20 RX ADMIN — FENTANYL CITRATE 50 MCG: 50 INJECTION, SOLUTION INTRAMUSCULAR; INTRAVENOUS at 12:27

## 2023-01-20 RX ADMIN — DEXAMETHASONE SODIUM PHOSPHATE 4 MG: 4 INJECTION, SOLUTION INTRAMUSCULAR; INTRAVENOUS at 10:58

## 2023-01-20 RX ADMIN — SODIUM CHLORIDE, SODIUM LACTATE, POTASSIUM CHLORIDE, AND CALCIUM CHLORIDE: 600; 310; 30; 20 INJECTION, SOLUTION INTRAVENOUS at 08:52

## 2023-01-20 RX ADMIN — Medication 2 G: at 10:45

## 2023-01-20 RX ADMIN — GLYCOPYRROLATE 0.4 MG: 0.2 INJECTION INTRAMUSCULAR; INTRAVENOUS at 12:28

## 2023-01-20 RX ADMIN — Medication 160 MG: at 10:33

## 2023-01-20 RX ADMIN — PROPOFOL 50 MG: 10 INJECTION, EMULSION INTRAVENOUS at 11:02

## 2023-01-20 RX ADMIN — FENTANYL CITRATE 50 MCG: 50 INJECTION, SOLUTION INTRAMUSCULAR; INTRAVENOUS at 10:36

## 2023-01-20 RX ADMIN — Medication 3 MG: at 12:28

## 2023-01-20 RX ADMIN — PROPOFOL 200 MG: 10 INJECTION, EMULSION INTRAVENOUS at 10:33

## 2023-01-20 ASSESSMENT — PAIN - FUNCTIONAL ASSESSMENT: PAIN_FUNCTIONAL_ASSESSMENT: 0-10

## 2023-01-20 NOTE — ANESTHESIA POSTPROCEDURE EVALUATION
Department of Anesthesiology  Postprocedure Note    Patient: Marsha Zamarripa  MRN: [id number]  YOB: 1950  Date of evaluation: 1/20/2023      Procedure Summary     Date: 01/20/23 Room / Location: [facility] MAIN OR 09 / [facility] MAIN OR    Anesthesia Start: 1024 Anesthesia Stop: 1257    Procedure: HERNIA UMBILICAL REPAIR LAPAROSCOPIC ROBOTIC/ TONYA (Abdomen) Diagnosis:       Hernia, umbilical      (Hernia, umbilical [O37.8])    Providers: Lashawn Hart MD Responsible Provider: Larry Valdez MD    Anesthesia Type: General ASA Status: 3          Anesthesia Type: General    Vero Phase I: Vero Score: 8    Vero Phase II: Vero Score: 10      Anesthesia Post Evaluation    Patient location during evaluation: PACU  Patient participation: complete - patient participated  Level of consciousness: awake and alert  Airway patency: patent  Nausea: well controlled. Complications: no  Cardiovascular status: acceptable.   Respiratory status: acceptable  Hydration status: stable

## 2023-01-20 NOTE — OP NOTE
Operative Note      Patient: Curt Mendoza  YOB: 1950  MRN: [id number]    Date of Procedure: 1/20/2023    Pre-Op Diagnosis: Hernia, umbilical [B01.1]    Post-Op Diagnosis: Same       Procedure(s): HERNIA UMBILICAL REPAIR LAPAROSCOPIC ROBOTIC/ TONYA    Surgeon(s):  Johnny Gimenez MD    Assistant:   * No surgical staff found *    Anesthesia: General    Estimated Blood Loss (mL): Minimal    Complications: None    Specimens:   * No specimens in log *    Implants:  Implant Name Type Inv. Item Serial No.  Lot No. LRB No. Used Action   MESH SURG DIA4. Jefe [employer] CIR SEPRA Celio Cody - [id number]  MESH SURG DIA4. 5IN CIR SEPRA TECHNOLOGY VENTRALIGHT  Rumson FAUSTO- [id number] N/A 1 Implanted         Drains: * No LDAs found *    Findings: See op note    Detailed Description of Procedure:   Dictated   VOX#[id number]    Electronically signed by Maribel Bales MD on 1/20/2023 at 12:32 PM

## 2023-01-20 NOTE — ANESTHESIA PROCEDURE NOTES
Airway  Date/Time: 1/20/2023 10:35 AM  Urgency: elective    Difficult airway    General Information and Staff    Patient location during procedure: OR  Anesthesiologist: Mery Bear MD  Resident/CRNA: TAMI Garza - CRNA  Performed: resident/CRNA     Indications and Patient Condition  Indications for airway management: anesthesia  Spontaneous ventilation: present  Sedation level: deep  Preoxygenated: yes  Patient position: sniffing  MILS not maintained throughout  Mask difficulty assessment: not attempted    Final Airway Details  Final airway type: endotracheal airway      Successful airway: ETT  Cuffed: yes   Successful intubation technique: video laryngoscopy  Facilitating devices/methods: intubating stylet  Endotracheal tube insertion site: oral  Blade size: #3  ETT size (mm): 8.0  Cormack-Lehane Classification: grade I - full view of glottis  Placement verified by: chest auscultation, bronchoscopy and capnometry   Inital cuff pressure (cm H2O): 8  Measured from: teeth  ETT to teeth (cm): 25  Number of attempts at approach: 1  Ventilation between attempts: bag mask  Number of other approaches attempted: 0    no

## 2023-01-20 NOTE — ANESTHESIA PRE PROCEDURE
Department of Anesthesiology  Preprocedure Note       Name:  Charlene Valenzuela   Age:  81 y.o.  :  1950                                          MRN:  [id number]         Date:  2023      Surgeon: Zhane Morton):  Sav Owens MD    Procedure: Procedure(s): HERNIA UMBILICAL REPAIR LAPAROSCOPIC ROBOTIC/ TONYA    Medications prior to admission:   Prior to Admission medications    Medication Sig Start Date End Date Taking?  Authorizing Provider   Cobalamin Combinations (NEURIVA PLUS PO) Take 1 tablet by mouth daily   Yes Historical Provider, MD   Misc Natural Products (NEURIVA PO) Take by mouth    Historical Provider, MD   TOUJEO SOLOSTAR 300 UNIT/ML SOPN Inject 40 Units into the skin at bedtime 22   Katie Michaud MD   OZEMPIC, 2 MG/DOSE, 8 MG/3ML SOPN Inject 2 mg into the skin once a week  Patient taking differently: Inject 2 mg into the skin once a week Takes on 22   Katie Michaud MD   metFORMIN (GLUCOPHAGE-XR) 500 MG extended release tablet Take 2 tablets by mouth 2 times daily (with meals) 22   Katie Michaud MD   glimepiride (AMARYL) 1 MG tablet Take 1 tablet by mouth 2 times daily (with meals) 22   Katie Michaud MD   BD PEN NEEDLE JESUS 2ND GEN 32G X 4 MM MISC Once a day 22   Katie Michaud MD   [facility] VERIO strip 2 times a day 22   Katie Michaud MD   OneTouch Delica Lancets 09Q MISC 2 times a day 22   Katie Michaud MD   fenofibrate (TRICOR) 145 MG tablet Take 1 tablet by mouth daily 22   Katie Michaud MD   VASCEPA 1 g CAPS capsule Take 2 capsules by mouth 2 times daily 22   Katie Michaud MD   ezetimibe (ZETIA) 10 MG tablet Take 1 tablet by mouth daily 22   Katie Michaud MD   GARLIC PO Take by mouth    Ar Automatic Reconciliation   coenzyme Q10 100 MG CAPS capsule Take 200 mg by mouth daily    Ar Automatic Reconciliation       Current medications:    Current Facility-Administered Medications   Medication Dose Route Frequency Provider Last Rate Last Admin    lidocaine 1 % injection 1 mL  1 mL IntraDERmal Once PRN Stanford Ferreira MD        acetaminophen (TYLENOL) tablet 1,000 mg  1,000 mg Oral Once Stanford Ferreira MD        lactated ringers IV soln infusion   IntraVENous Continuous Stanford Ferreira MD        sodium chloride flush 0.9 % injection 5-40 mL  5-40 mL IntraVENous 2 times per day Stanford Ferreira MD        sodium chloride flush 0.9 % injection 5-40 mL  5-40 mL IntraVENous PRN Stanford Ferreira MD        0.9 % sodium chloride infusion   IntraVENous PRN Stanford Ferreira MD        midazolam PF (VERSED) injection 2 mg  2 mg IntraVENous Once PRN Stanford Ferreira MD        sodium chloride flush 0.9 % injection 5-40 mL  5-40 mL IntraVENous 2 times per day Gamaliel Jimenez MD        sodium chloride flush 0.9 % injection 5-40 mL  5-40 mL IntraVENous PRN Gamaliel Jimenez MD        0.9 % sodium chloride infusion   IntraVENous PRN Gamaliel Jimenez MD        ceFAZolin (ANCEF) 2000 mg in sterile water 20 mL IV syringe  2,000 mg IntraVENous Once Gamaliel Jimenez MD           Allergies:     Allergies   Allergen Reactions    Penicillins Hives       Problem List:    Patient Active Problem List   Diagnosis Code    Controlled type 2 diabetes mellitus without complication, without long-term current use of insulin ([facility]) E11.9    Mixed hyperlipidemia E78.2    Dyspnea R06.00    Chest pain R07.9    Myalgia M79.10    Severe obesity ([facility] E66.01    Hypertriglyceridemia E78.1    Sleep apnea G47.30    Uncontrolled type 2 diabetes mellitus with hyperglycemia, without long-term current use of insulin ([facility]) N91.64    Umbilical hernia without obstruction and without gangrene K42.9       Past Medical History:        Diagnosis Date    Chest pain 3/28/2016    Arm heaviness with ambulation     Dyspnea     Hyperlipidemia     Hypertriglyceridemia 7/21/2016    Kidney stones     Myalgia 4/25/2016    SREE on CPAP     Type 2 diabetes mellitus ([facility] [address] Avg FBS 70-90, hypo s/sx <70, last A1c 7.0 (8/18/22) oral and injectables       Past Surgical History:        Procedure Laterality Date    COLONOSCOPY  2019    LITHOTRIPSY  11/2018    TONSILLECTOMY AND ADENOIDECTOMY      WISDOM TOOTH EXTRACTION         Social History:    Social History     Tobacco Use    Smoking status: Never    Smokeless tobacco: Never   Substance Use Topics    Alcohol use: No                                Counseling given: Not Answered      Vital Signs (Current):   Vitals:    01/13/23 1502 01/20/23 0831   BP:  (!) 149/77   Pulse:  70   Resp:  16   Temp:  97.3 °F (36.3 °C)   TempSrc:  Oral   SpO2:  96%   Weight: 250 lb (113.4 kg) 247 lb (112 kg)   Height: 5' 11\" (1.803 m) 5' 11\" (1.803 m)                                              BP Readings from Last 3 Encounters:   01/20/23 (!) 149/77   12/19/22 (!) 146/80   08/24/22 (!) 140/74       NPO Status:                                                                                 BMI:   Wt Readings from Last 3 Encounters:   01/20/23 247 lb (112 kg)   12/22/22 250 lb (113.4 kg)   12/19/22 248 lb (112.5 kg)     Body mass index is 34.45 kg/m². CBC: No results found for: WBC, RBC, HGB, HCT, MCV, RDW, PLT    CMP:   Lab Results   Component Value Date/Time     02/14/2022 11:14 AM    K 5.3 02/14/2022 11:14 AM     02/14/2022 11:14 AM    CO2 22 02/14/2022 11:14 AM    BUN 21 02/14/2022 11:14 AM    CREATININE 1.33 02/14/2022 11:14 AM    GFRAA 63 02/14/2022 11:14 AM    AGRATIO 2.4 02/14/2022 11:14 AM    GLUCOSE 101 02/14/2022 11:14 AM    PROT 6.8 02/14/2022 11:14 AM    CALCIUM 9.4 02/14/2022 11:14 AM    BILITOT 0.4 02/14/2022 11:14 AM    ALKPHOS 57 02/14/2022 11:14 AM    AST 19 02/14/2022 11:14 AM    ALT 27 02/14/2022 11:14 AM       POC Tests: No results for input(s): POCGLU, POCNA, POCK, POCCL, POCBUN, POCHEMO, POCHCT in the last 72 hours.     Coags: No results found for: PROTIME, INR, APTT    HCG (If Applicable): No results found for: PREGTESTUR, PREGSERUM, HCG, HCGQUANT     ABGs: No results found for: PHART, PO2ART, SUE9IMK, MIY4AFP, BEART, F9LTGUOJ     Type & Screen (If Applicable):  No results found for: LABABO, LABRH    Drug/Infectious Status (If Applicable):  No results found for: HIV, HEPCAB    COVID-19 Screening (If Applicable): No results found for: COVID19        Anesthesia Evaluation  Patient summary reviewed and Nursing notes reviewed no history of anesthetic complications:   Airway: Mallampati: III  TM distance: >3 FB   Neck ROM: full  Mouth opening: > = 3 FB   Dental: normal exam         Pulmonary:normal exam    (+) sleep apnea: on CPAP,  asthma:                            Cardiovascular:    (+) hyperlipidemia                  Neuro/Psych:               GI/Hepatic/Renal:   (+) renal disease (CKD):,          ROS comment: UC    Obesity. Endo/Other:    (+) DiabetesType II DM, , .                 Abdominal:             Vascular: Other Findings:           Anesthesia Plan      general     ASA 3     (GETA with glidescope)  Induction: intravenous. MIPS: Postoperative opioids intended. Anesthetic plan and risks discussed with patient.                         Meet Camp MD   1/20/2023

## 2023-01-20 NOTE — BRIEF OP NOTE
Brief Postoperative Note      Patient: Ruben Marte  YOB: 1950  MRN: [id number]    Date of Procedure: 1/20/2023    Pre-Op Diagnosis: Hernia, umbilical [N30.7]    Post-Op Diagnosis: Same       Procedure(s): HERNIA UMBILICAL REPAIR LAPAROSCOPIC ROBOTIC/ TONYA    Surgeon(s):  Shayy Cox MD    Assistant:  * No surgical staff found *    Anesthesia: General    Estimated Blood Loss (mL): Minimal    Complications: None    Specimens:   * No specimens in log *    Implants:  Implant Name Type Inv. Item Serial No.  Lot No. LRB No. Used Action   MESH SURG DIA4. [employer] CIR SEPRA Robe Balling - [id number]  MESH SURG DIA4. 5IN CIR SEPRA TECHNOLOGY VENTRALIGHT  BARD DAVOL- [id number] N/A 1 Implanted         Drains: * No LDAs found *    Findings: See op note    Electronically signed by Huan Holder MD on 1/20/2023 at 12:31 PM

## 2023-01-20 NOTE — PROGRESS NOTES
Spiritual Care visit. Initial Visit, Pre Surgery Consult. Patient had already been taken to surgery;  visited his wife in Surgery Waiting Room, offered encouragement, and prayed for patients surgery.     Visit by Michel Olvera M.Ed., Th.B. ,Staff

## 2023-01-21 NOTE — OP NOTE
[address] [facility]  OPERATIVE REPORT    Name:  Ryne Burris  MR#:  [id number]  :  1950  ACCOUNT #:  [de-identified]  DATE OF SERVICE:  2023    PREOPERATIVE DIAGNOSIS:  Umbilical hernia. POSTOPERATIVE DIAGNOSIS:  Umbilical hernia. PROCEDURE PERFORMED:  Robotic umbilical hernia repair with mesh. SURGEON:  Bianca Romano MD    ASSISTANT:  None. ANESTHESIA:  General endotracheal.    COMPLICATIONS:  None. SPECIMENS REMOVED:  None. IMPLANTS:  A 4.5-inch Ventralight mesh. ESTIMATED BLOOD LOSS:  Minimal.    COUNTS:  Correct. PROCEDURE:  After the patient was brought into the operating room, he was placed under general anesthesia. Time-out was carried out and all were in agreement. An incision was made in the midclavicular line just below the left subcostal margin. Dissection carried down through the fascia to the peritoneum, which was opened under direct visualization. I then placed an 8-mm robotic trocar. Camera was inserted. Insufflation acquired. The patient had a fairly large umbilical hernia that had omental attachments. 10 cm inferiorly and laterally another 8-mm trocar was placed and 10 cm inferiorly another 8-mm trocar was placed all within the left abdomen. Each of these incisions had 0.5% Marcaine with epinephrine instilled in the skin, subcutaneous tissue and peritoneum. At this time, the robot was brought in and docked. I then sat at the console. Using heated scissors with cautery, took down the omentum. There was then a fair amount of fat still contained within the defect. This was grasped, taken down and completely dissected free. I also dissected the hernia sac out completely. I placed all of this tissue just below the defect. At this point, using a 0 V-Loc suture and running this, I closed the defect completely.   Once this was completed, the Ventralight mesh of 4.5 inch [place] was placed, using a 2-0 Stratafix tacking this in the middle with the PTFE facing the abdominal cavity. I then ran this toward myself and then started around the periphery. Approximately four of these were utilized to completely tack the mesh up covering the defect with at least 2.5 cm to 3 cm overlay on all sides. Once this was completed, all sutures were removed. An 8-mm bag was then placed and the tissue described prior was placed in the bag. Robot was undocked. I then re-scrubbed, removed the bag with the tissue. I then put interrupted 0 Vicryl at the first 8-mm trocar site. 4-0 Monocryl subcuticular and Dermabond used to close the skin. The patient tolerated the procedure well.         Zaida Barber MD      [id number]  D:  01/20/2023 12:37  T:  01/21/2023 1:41  JOB #:  [id number]

## 2023-01-30 ENCOUNTER — OFFICE VISIT (OUTPATIENT)
Dept: INTERNAL MEDICINE CLINIC | Facility: CLINIC | Age: 73
End: 2023-01-30
Payer: MEDICARE

## 2023-01-30 VITALS
HEART RATE: 66 BPM | WEIGHT: 245 LBS | RESPIRATION RATE: 16 BRPM | HEIGHT: 71 IN | DIASTOLIC BLOOD PRESSURE: 77 MMHG | TEMPERATURE: 97.9 F | BODY MASS INDEX: 34.3 KG/M2 | OXYGEN SATURATION: 98 % | SYSTOLIC BLOOD PRESSURE: 125 MMHG

## 2023-01-30 DIAGNOSIS — M62.08 DIASTASIS RECTI: ICD-10-CM

## 2023-01-30 DIAGNOSIS — I10 PRIMARY HYPERTENSION: Primary | ICD-10-CM

## 2023-01-30 DIAGNOSIS — E78.2 MIXED HYPERLIPIDEMIA: ICD-10-CM

## 2023-01-30 PROCEDURE — 1123F ACP DISCUSS/DSCN MKR DOCD: CPT | Performed by: INTERNAL MEDICINE

## 2023-01-30 PROCEDURE — 99212 OFFICE O/P EST SF 10 MIN: CPT | Performed by: INTERNAL MEDICINE

## 2023-01-30 PROCEDURE — G8417 CALC BMI ABV UP PARAM F/U: HCPCS | Performed by: INTERNAL MEDICINE

## 2023-01-30 PROCEDURE — 3078F DIAST BP <80 MM HG: CPT | Performed by: INTERNAL MEDICINE

## 2023-01-30 PROCEDURE — 3074F SYST BP LT 130 MM HG: CPT | Performed by: INTERNAL MEDICINE

## 2023-01-30 PROCEDURE — 3017F COLORECTAL CA SCREEN DOC REV: CPT | Performed by: INTERNAL MEDICINE

## 2023-01-30 PROCEDURE — G8484 FLU IMMUNIZE NO ADMIN: HCPCS | Performed by: INTERNAL MEDICINE

## 2023-01-30 PROCEDURE — 1036F TOBACCO NON-USER: CPT | Performed by: INTERNAL MEDICINE

## 2023-01-30 PROCEDURE — G8427 DOCREV CUR MEDS BY ELIG CLIN: HCPCS | Performed by: INTERNAL MEDICINE

## 2023-01-30 ASSESSMENT — PATIENT HEALTH QUESTIONNAIRE - PHQ9
SUM OF ALL RESPONSES TO PHQ QUESTIONS 1-9: 0
2. FEELING DOWN, DEPRESSED OR HOPELESS: 0
1. LITTLE INTEREST OR PLEASURE IN DOING THINGS: 0
SUM OF ALL RESPONSES TO PHQ QUESTIONS 1-9: 0
SUM OF ALL RESPONSES TO PHQ QUESTIONS 1-9: 0
SUM OF ALL RESPONSES TO PHQ9 QUESTIONS 1 & 2: 0
SUM OF ALL RESPONSES TO PHQ QUESTIONS 1-9: 0

## 2023-02-02 ENCOUNTER — OFFICE VISIT (OUTPATIENT)
Dept: SURGERY | Age: 73
End: 2023-02-02

## 2023-02-02 DIAGNOSIS — K42.9 UMBILICAL HERNIA WITHOUT OBSTRUCTION AND WITHOUT GANGRENE: ICD-10-CM

## 2023-02-02 DIAGNOSIS — Z09 POSTOPERATIVE EXAMINATION: Primary | ICD-10-CM

## 2023-02-02 PROCEDURE — 99024 POSTOP FOLLOW-UP VISIT: CPT

## 2023-02-02 NOTE — PROGRESS NOTES
[facility]  [phone]        poDate: 2023      Name: Duc Todd      MRN: [id number]       : 1950       Age: 81 y.o. Sex: male        Alexsander Melendez MD       CC: postoperative visit       HPI:  The patient presents for a post-op visit s/p Robotic umbilical hernia repair with mesh. Tanisha Arroyo MD 23        Physical Exam:     There were no vitals taken for this visit. General: Alert, oriented, cooperative and in no acute distress. Neck: Supple, trachea midline, no appreciable thyromegaly  Resp: No JVD. Breathing is  non-labored. Lungs clear to auscultation without wheezing or rhonchi   CV: RRR. No murmurs, rubs or gallops appreciated. Abd: soft non-tender and non-distended without peritoneal signs. +bs    Skin/incision:  trocar sites are Clean, dry and intact with no signs of infection. No fluid collections palpated. Assessment/Plan:  Duc Todd is a 81 y.o. male who is s/p Robotic umbilical hernia repair with mesh. Tanisha Arroyo MD 23. 1. Follow-up as needed    2. 4 weeks after surgery, lift nothing heavier than 25 pounds. Weeks 4-6 after surgery slowly increase lifting weight. 6 weeks after surgery, return to full activity    3. Regular diet  4. Do not get constipated. No more than 2 days without a bm. If 2 days no bm, then take colace stool softener twice a day. If 24 hours of colace does not produce a bm , then keep taking colace and add miralax laxative twice a day until you have a bm. Once you are having regular bms, you can use colace and miralax as needed.      Signed: TAMI Tyler - DENITA    2023  3:05 PM

## 2023-02-02 NOTE — PATIENT INSTRUCTIONS
1. Follow-up as needed    2. 4 weeks after surgery, lift nothing heavier than 25 pounds. Weeks 4-6 after surgery slowly increase lifting weight. 6 weeks after surgery, return to full activity    3. Regular diet  4. Do not get constipated. No more than 2 days without a bm. If 2 days no bm, then take colace stool softener twice a day. If 24 hours of colace does not produce a bm , then keep taking colace and add miralax laxative twice a day until you have a bm. Once you are having regular bms, you can use colace and miralax as needed.

## 2023-02-10 NOTE — PROGRESS NOTES
01/30/2023   Location:[facility] [address] [facility]  [place]  Patient #:  [id number]  YOB: 1950        History of Present Illness     Chief Complaint   Patient presents with    Follow-up Chronic Condition     6 week f/u       Mr. Griffin Hurd is a 81 y.o. male  who presents for Chronic active medical issues DM, HTN, HLD reports tolerance to meds and compliance. He is under the care of endocrinologist for his diabetes. He is under the care of pulmonologist for SREE  Has had hernia surgery and recovering well. Last Labs  CBC: No results found for: WBC, RBC, HGB, HCT, MCV, MCH, MCHC, RDW, PLT, MPV  CMP:    Lab Results   Component Value Date/Time     02/14/2022 11:14 AM    K 5.3 02/14/2022 11:14 AM     02/14/2022 11:14 AM    CO2 22 02/14/2022 11:14 AM    BUN 21 02/14/2022 11:14 AM    CREATININE 1.33 02/14/2022 11:14 AM    GFRAA 63 02/14/2022 11:14 AM    AGRATIO 2.4 02/14/2022 11:14 AM    GLUCOSE 101 02/14/2022 11:14 AM    PROT 6.8 02/14/2022 11:14 AM    LABALBU 4.8 02/14/2022 11:14 AM    CALCIUM 9.4 02/14/2022 11:14 AM    BILITOT 0.4 02/14/2022 11:14 AM    ALKPHOS 57 02/14/2022 11:14 AM    AST 19 02/14/2022 11:14 AM    ALT 27 02/14/2022 11:14 AM       Allergies   Allergen Reactions    Penicillins Hives     Past Medical History:   Diagnosis Date    Chest pain 3/28/2016    Arm heaviness with ambulation     Dyspnea     Hyperlipidemia     Hypertriglyceridemia 7/21/2016    Kidney stones     Myalgia 4/25/2016    SREE on CPAP     Type 2 diabetes mellitus (HCC)     Avg FBS 70-90, hypo s/sx <70, last A1c 7.0 (8/18/22) oral and injectables    Umbilical hernia without obstruction and without gangrene 26/28/3005    Robotic umbilical hernia repair with mesh. Shahid Schafer MD 1/20/23.      Social History     Socioeconomic History    Marital status:      Spouse name: None    Number of children: None    Years of education: None    Highest education level: None Tobacco Use    Smoking status: Never    Smokeless tobacco: Never   Vaping Use    Vaping Use: Never used   Substance and Sexual Activity    Alcohol use: No    Drug use: Never     Past Surgical History:   Procedure Laterality Date    COLONOSCOPY  2019    LITHOTRIPSY  11/2018    TONSILLECTOMY AND ADENOIDECTOMY      UMBILICAL HERNIA REPAIR N/A 8/25/3463    HERNIA UMBILICAL REPAIR LAPAROSCOPIC ROBOTIC/ TONYA performed by Nawaf Salguero MD at [facility] MAIN OR    WISDOM TOOTH EXTRACTION       Family History   Problem Relation Age of Onset    Cancer Mother     Dementia Mother     Cancer Father     Dementia Father     Coronary Art Dis Paternal Aunt 72    Heart Disease Paternal Uncle     Diabetes Paternal Uncle     Hypertension Other     Stroke Other      Current Outpatient Medications   Medication Sig Dispense Refill    Cobalamin Combinations (NEURIVA PLUS PO) Take 1 tablet by mouth daily      Misc Natural Products (NEURIVA PO) Take by mouth      TOUJEO SOLOSTAR 300 UNIT/ML SOPN Inject 40 Units into the skin at bedtime 4.5 mL 11    OZEMPIC, 2 MG/DOSE, 8 MG/3ML SOPN Inject 2 mg into the skin once a week (Patient taking differently: Inject 2 mg into the skin once a week Takes on Sunday) 3 mL 11    metFORMIN (GLUCOPHAGE-XR) 500 MG extended release tablet Take 2 tablets by mouth 2 times daily (with meals) 120 tablet 11    glimepiride (AMARYL) 1 MG tablet Take 1 tablet by mouth 2 times daily (with meals) 60 tablet 11    BD PEN NEEDLE JESUS 2ND GEN 32G X 4 MM MISC Once a day 50 each 11    ONETOUCH VERIO strip 2 times a day 100 each 11    OneTouch Delica Lancets 33H MISC 2 times a day 100 each 11    fenofibrate (TRICOR) 145 MG tablet Take 1 tablet by mouth daily 30 tablet 11    VASCEPA 1 g CAPS capsule Take 2 capsules by mouth 2 times daily 120 capsule 11    ezetimibe (ZETIA) 10 MG tablet Take 1 tablet by mouth daily 30 tablet 11    GARLIC PO Take by mouth      coenzyme Q10 100 MG CAPS capsule Take 200 mg by mouth daily       No current facility-administered medications for this visit. Health Maintenance   Topic Date Due    Pneumococcal 65+ years Vaccine (1 - PCV) Never done    Hepatitis C screen  Never done    Colorectal Cancer Screen  Never done    Shingles vaccine (1 of 2) Never done    COVID-19 Vaccine (3 - Booster for Virl Binet series) 05/22/2021    Annual Wellness Visit (AWV)  Never done    Flu vaccine (1) Never done    GFR test (Diabetes, CKD 3-4, OR last GFR 15-59)  02/14/2023    Diabetic retinal exam  04/11/2023    A1C test (Diabetic or Prediabetic)  08/18/2023    Diabetic Alb to Cr ratio (uACR) test  08/18/2023    Lipids  08/18/2023    Diabetic foot exam  12/19/2023    Depression Screen  01/30/2024    DTaP/Tdap/Td vaccine (2 - Td or Tdap) 08/18/2028    Hepatitis A vaccine  Aged Out    Hib vaccine  Aged Out    Meningococcal (ACWY) vaccine  Aged Out             Review of Systems  Review of Systems    /77 (Site: Left Upper Arm, Position: Sitting)   Pulse 66   Temp 97.9 °F (36.6 °C) (Temporal)   Resp 16   Ht 5' 11\" (1.803 m)   Wt 245 lb (111.1 kg)   SpO2 98%   BMI 34.17 kg/m²     Wt Readings from Last 3 Encounters:   01/30/23 245 lb (111.1 kg)   01/20/23 247 lb (112 kg)   12/22/22 250 lb (113.4 kg)       Physical Exam    Physical Exam  Vitals and nursing note reviewed. Constitutional:       Appearance: Normal appearance. He is obese. HENT:      Head: Normocephalic and atraumatic. Cardiovascular:      Rate and Rhythm: Normal rate and regular rhythm. Pulmonary:      Effort: Pulmonary effort is normal.      Breath sounds: Normal breath sounds. Abdominal:      General: Bowel sounds are normal.      Palpations: Abdomen is soft. Neurological:      Mental Status: He is alert. Assessment & Plan    Encounter Diagnoses   Name Primary? Primary hypertension Yes    Mixed hyperlipidemia     Diastasis recti        Discussed the importance of regular exercise and a healthy diet.            Return for routine follow up of chronic medical issues.         Huan Delaney MD

## 2023-03-06 ENCOUNTER — OFFICE VISIT (OUTPATIENT)
Dept: ENDOCRINOLOGY | Age: 73
End: 2023-03-06
Payer: MEDICARE

## 2023-03-06 VITALS
SYSTOLIC BLOOD PRESSURE: 128 MMHG | HEART RATE: 75 BPM | DIASTOLIC BLOOD PRESSURE: 70 MMHG | BODY MASS INDEX: 34.03 KG/M2 | WEIGHT: 244 LBS | OXYGEN SATURATION: 95 %

## 2023-03-06 DIAGNOSIS — E11.9 CONTROLLED TYPE 2 DIABETES MELLITUS WITHOUT COMPLICATION, WITHOUT LONG-TERM CURRENT USE OF INSULIN (HCC): Primary | ICD-10-CM

## 2023-03-06 DIAGNOSIS — E78.2 MIXED HYPERLIPIDEMIA: ICD-10-CM

## 2023-03-06 LAB — HBA1C MFR BLD: 6.4 %

## 2023-03-06 PROCEDURE — 3046F HEMOGLOBIN A1C LEVEL >9.0%: CPT | Performed by: INTERNAL MEDICINE

## 2023-03-06 PROCEDURE — 83036 HEMOGLOBIN GLYCOSYLATED A1C: CPT | Performed by: INTERNAL MEDICINE

## 2023-03-06 PROCEDURE — G8484 FLU IMMUNIZE NO ADMIN: HCPCS | Performed by: INTERNAL MEDICINE

## 2023-03-06 PROCEDURE — 3017F COLORECTAL CA SCREEN DOC REV: CPT | Performed by: INTERNAL MEDICINE

## 2023-03-06 PROCEDURE — G8427 DOCREV CUR MEDS BY ELIG CLIN: HCPCS | Performed by: INTERNAL MEDICINE

## 2023-03-06 PROCEDURE — 2022F DILAT RTA XM EVC RTNOPTHY: CPT | Performed by: INTERNAL MEDICINE

## 2023-03-06 PROCEDURE — G8417 CALC BMI ABV UP PARAM F/U: HCPCS | Performed by: INTERNAL MEDICINE

## 2023-03-06 PROCEDURE — 1123F ACP DISCUSS/DSCN MKR DOCD: CPT | Performed by: INTERNAL MEDICINE

## 2023-03-06 PROCEDURE — 99214 OFFICE O/P EST MOD 30 MIN: CPT | Performed by: INTERNAL MEDICINE

## 2023-03-06 PROCEDURE — 1036F TOBACCO NON-USER: CPT | Performed by: INTERNAL MEDICINE

## 2023-03-06 RX ORDER — PITAVASTATIN CALCIUM 1.04 MG/1
1 TABLET, FILM COATED ORAL NIGHTLY
Qty: 90 TABLET | Refills: 3 | Status: SHIPPED | OUTPATIENT
Start: 2023-03-06

## 2023-03-06 RX ORDER — PEN NEEDLE, DIABETIC 32GX 5/32"
NEEDLE, DISPOSABLE MISCELLANEOUS
Qty: 50 EACH | Refills: 11 | Status: SHIPPED | OUTPATIENT
Start: 2023-03-06

## 2023-03-06 RX ORDER — EZETIMIBE 10 MG/1
10 TABLET ORAL DAILY
Qty: 30 TABLET | Refills: 11 | Status: SHIPPED | OUTPATIENT
Start: 2023-03-06

## 2023-03-06 RX ORDER — FENOFIBRATE 145 MG/1
145 TABLET, COATED ORAL DAILY
Qty: 30 TABLET | Refills: 11 | Status: SHIPPED | OUTPATIENT
Start: 2023-03-06

## 2023-03-06 RX ORDER — GLIMEPIRIDE 1 MG/1
1 TABLET ORAL 2 TIMES DAILY WITH MEALS
Qty: 60 TABLET | Refills: 11 | Status: SHIPPED | OUTPATIENT
Start: 2023-03-06

## 2023-03-06 RX ORDER — SEMAGLUTIDE 2.68 MG/ML
2 INJECTION, SOLUTION SUBCUTANEOUS WEEKLY
Qty: 3 ML | Refills: 11 | Status: SHIPPED | OUTPATIENT
Start: 2023-03-06

## 2023-03-06 RX ORDER — BLOOD SUGAR DIAGNOSTIC
STRIP MISCELLANEOUS
Qty: 100 EACH | Refills: 11 | Status: SHIPPED | OUTPATIENT
Start: 2023-03-06

## 2023-03-06 RX ORDER — ICOSAPENT ETHYL 1000 MG/1
2 CAPSULE ORAL 2 TIMES DAILY
Qty: 120 CAPSULE | Refills: 11 | Status: SHIPPED | OUTPATIENT
Start: 2023-03-06

## 2023-03-06 RX ORDER — INSULIN GLARGINE 300 U/ML
40 INJECTION, SOLUTION SUBCUTANEOUS NIGHTLY
Qty: 4.5 ML | Refills: 11 | Status: SHIPPED | OUTPATIENT
Start: 2023-03-06

## 2023-03-06 RX ORDER — METFORMIN HYDROCHLORIDE 500 MG/1
1000 TABLET, EXTENDED RELEASE ORAL 2 TIMES DAILY WITH MEALS
Qty: 120 TABLET | Refills: 11 | Status: SHIPPED | OUTPATIENT
Start: 2023-03-06

## 2023-03-06 RX ORDER — LANCETS 30 GAUGE
EACH MISCELLANEOUS
Qty: 100 EACH | Refills: 11 | Status: SHIPPED | OUTPATIENT
Start: 2023-03-06

## 2023-03-06 ASSESSMENT — ENCOUNTER SYMPTOMS: SHORTNESS OF BREATH: 1

## 2023-03-06 NOTE — PROGRESS NOTES
Angelito Workman MD, [facility]  [address], [address] [facility], [address] [place] [address]  Phone [phone]  Facsimile [phone]          Amairani Recinos is a 81 y.o. male seen 3/6/2023 for follow-up of type 2 diabetes mellitus           ASSESSMENT AND PLAN:    1. Controlled type 2 diabetes mellitus without complication, without long-term current use of insulin (HCC)  His glycemic control has improved and his hemoglobin A1c is at goal less than 7. Eye exam negative 4/2022. Urine microalbumin negative 8/2022. Update labs prior to next visit.    - TOUJEO SOLOSTAR 300 UNIT/ML concentrated injection pen; Inject 40 Units into the skin at bedtime  Dispense: 4.5 mL; Refill: 11  - OZEMPIC, 2 MG/DOSE, 8 MG/3ML SOPN; Inject 2 mg into the skin once a week  Dispense: 3 mL; Refill: 11  - metFORMIN (GLUCOPHAGE-XR) 500 MG extended release tablet; Take 2 tablets by mouth 2 times daily (with meals)  Dispense: 120 tablet; Refill: 11  - glimepiride (AMARYL) 1 MG tablet; Take 1 tablet by mouth 2 times daily (with meals)  Dispense: 60 tablet; Refill: 11  - BD PEN NEEDLE JESUS 2ND GEN 32G X 4 MM MISC; Once a day  Dispense: 50 each; Refill: 11  - ONETOUCH VERIO strip; 2 times a day  Dispense: 100 each; Refill: 11  - OneTouch Delica Lancets 70J MISC; 2 times a day  Dispense: 100 each; Refill: 11    2. Mixed hyperlipidemia  He has been intolerant of multiple statins as outlined below. His insurance would not cover Nexletol or Nexlizet. His LDL is not at goal.  His triglycerides remain elevated despite fenofibrate, Vascepa and improved glycemic control. I will have him try Livalo as below. - fenofibrate (TRICOR) 145 MG tablet; Take 1 tablet by mouth daily  Dispense: 30 tablet; Refill: 11  - VASCEPA 1 g CAPS capsule; Take 2 capsules by mouth 2 times daily  Dispense: 120 capsule; Refill: 11  - ezetimibe (ZETIA) 10 MG tablet; Take 1 tablet by mouth daily  Dispense: 30 tablet;  Refill: 11  - pitavastatin (LIVALO) 1 MG TABS tablet; Take 1 tablet by mouth nightly  Dispense: 90 tablet; Refill: 3      Follow-up and Dispositions    Return in about 4 months (around 7/6/2023). HISTORY OF PRESENT ILLNESS:    DIABETES MELLITUS     Diagnosis: Type 2 diabetes mellitus diagnosed 2014. Diet: No sweet tea or regular soft drinks. He tries to limit carbohydrates. He avoids sweets. Exercise: No regular exercise but active at work. Monitoring: [employer] - 1 time per day. Blood Glucose: By review of meter download: Fasting . Hypoglycemia: No recent lows. Hemoglobin A1c:  3/11/2016: 7.8.  6/24/2016: 9.0.  9/30/2016: 9.8.  1/6/2017: 7.3.  4/6/2017: 6.7.  10/23/2017: 6.4.  4/19/2018: 7.1.  8/7/2018: 8.4.  10/22/2018: 8.5.  2/8/2019: 8.6.  5/14/2019: 8.5.  11/21/2019: 7.8.  9/15/2020: 6.5.  1/27/2021: 6.3.  5/21/2021: 6.9.  10/4/2021: 7.1.  2/14/2022: 6.8.  8/18/2022: 7.0.  3/6/2023: 6.4. Microalbumin/Nephropathy:  3/11/2016: Creatinine 0.8 (GFR >90), urine microalbumin/creatinine 7.  6/24/2016: Creatinine 0.9 (GFR 85). 9/30/2016: Creatinine 0.9 (GFR 85). 1/6/2017: Creatinine 1.1 (GFR 67). 1/25/2017: Urine microalbumin/creatinine <8.8.  4/6/2017: Creatinine 1.1 (GFR 67), urine microalbumin/creatinine <5.  10/23/2017: Creatinine 1.1 (GFR 67).  4/19/2018: Creatinine 1.0 (GFR >60). 5/2/2018: Urine microalbumin/creatinine <4.9.  8/7/2018: Creatinine 0.9 (GFR 84). 2/8/2019: Creatinine 1.15 (GFR 66), urine microalbumin/creatinine 8.  3/26/2020: Urine microalbumin/creatinine 36.  9/15/2020: Creatinine 1.40 (GFR 51). 5/21/2021: Creatinine 1.40 (GFR 31), urine microalbumin/creatinine 9.  2/14/2022: Creatinine 1.33 (GFR 54). 8/18/2022: Creatinine 1.33 (GFR 58), urine albumin/creatinine 7. Neuropathy: Denies burning, numbness, tingling of feet. Retinopathy: Eye exam 4/2022 - no retinopathy. Lipids: Gemfibrozil stopped due to arthralgias.   He was having some myalgias in his hips on pravastatin. His cardiologist changed him to atorvastatin. He is also taking fenofibrate. His PCP changed him from atorvastatin to rosuvastatin due to myalgias. He stopped rosuvastatin due to cramps. His insurance would not cover Nexletol or Nexlizet. 3/11/2016: Total cholesterol 295, triglycerides 1618, HDL 35.   6/24/2016: Total cholesterol 238, triglycerides 399, HDL 35.  9/30/2016: Total cholesterol 259, triglycerides 684, HDL 42.  1/6/2017: Total cholesterol 209, triglycerides 330, HDL 45, LDL 98.  4/6/2017: Total cholesterol 194, triglycerides 390, HDL 41, LDL 75.  10/23/2017: Total cholesterol 162, triglycerides 264, HDL 41, LDL 68.  4/19/2018: Total cholesterol 172, triglycerides 232, HDL 40, LDL 86.  8/7/2018: Total cholesterol 157, triglycerides 284, HDL 41, LDL 59.  2/8/2019: Total cholesterol 154, triglycerides 194, HDL 41, LDL 74, VLDL 39.  5/14/2019: Total cholesterol 175, triglycerides 298, HDL 37, LDL 78, VLDL 60.  11/21/2019: Total cholesterol 160, triglycerides 352, HDL 44, LDL 46, VLDL 70.  9/15/2020: Total cholesterol 237, triglycerides 448, HDL 40.  5/21/2021: Total cholesterol 240, triglycerides 391, HDL 40, , VLDL 70.  10/4/2021: Total cholesterol 245, triglycerides 290, HDL 45, , VLDL 53.  2/14/2022: Total cholesterol 264, triglycerides 253, HDL 46, , VLDL 48.  8/18/2022: Total cholesterol 201, triglycerides 244, HDL 43, , VLDL 43. TSH:  3/11/2016: TSH 1.611.  5/21/2021: TSH 3.640.  8/18/2022: TSH 2.690. Prior treatment: He stopped Real Kalata due to frequent urination and bladder spasms. Review of Systems   Constitutional:  Positive for fatigue. Weight decreased 11 pounds since last visit. Respiratory:  Positive for shortness of breath (occasionally). Cardiovascular:  Negative for chest pain.      Vital Signs:  /70   Pulse 75   Wt 244 lb (110.7 kg)   SpO2 95%   BMI 34.03 kg/m²     Wt Readings from Last 3 Encounters:   03/06/23 244 lb (110.7 kg)   01/30/23 245 lb (111.1 kg)   01/20/23 247 lb (112 kg)       Physical Exam  Constitutional:       General: He is not in acute distress. Neck:      Thyroid: No thyroid mass or thyromegaly. Cardiovascular:      Rate and Rhythm: Normal rate and regular rhythm. Comments: DP pulses 2+ bilaterally. No edema. Lymphadenopathy:      Cervical: No cervical adenopathy. Skin:     Comments: No lesions on feet. Neurological:      Motor: No tremor. Comments: Monofilament intact. Ankle reflexes decreased.          Orders Placed This Encounter   Procedures    Hemoglobin A1C     Standing Status:   Future     Standing Expiration Date:   3/6/2024    Comprehensive Metabolic Panel     Standing Status:   Future     Standing Expiration Date:   3/6/2024    Lipid Panel     Standing Status:   Future     Standing Expiration Date:   3/6/2024    Microalbumin / Creatinine Urine Ratio     Standing Status:   Future     Standing Expiration Date:   3/6/2024    TSH with Reflex     Standing Status:   Future     Standing Expiration Date:   3/6/2024    AMB POC HEMOGLOBIN A1C         Current Outpatient Medications   Medication Sig Dispense Refill    TOUJEO SOLOSTAR 300 UNIT/ML concentrated injection pen Inject 40 Units into the skin at bedtime 4.5 mL 11    OZEMPIC, 2 MG/DOSE, 8 MG/3ML SOPN Inject 2 mg into the skin once a week 3 mL 11    metFORMIN (GLUCOPHAGE-XR) 500 MG extended release tablet Take 2 tablets by mouth 2 times daily (with meals) 120 tablet 11    glimepiride (AMARYL) 1 MG tablet Take 1 tablet by mouth 2 times daily (with meals) 60 tablet 11    BD PEN NEEDLE JESUS 2ND GEN 32G X 4 MM MISC Once a day 50 each 11    ONETOUCH VERIO strip 2 times a day 100 each 11    OneTouch Delica Lancets 50Z MISC 2 times a day 100 each 11    fenofibrate (TRICOR) 145 MG tablet Take 1 tablet by mouth daily 30 tablet 11    VASCEPA 1 g CAPS capsule Take 2 capsules by mouth 2 times daily 120 capsule 11 ezetimibe (ZETIA) 10 MG tablet Take 1 tablet by mouth daily 30 tablet 11    pitavastatin (LIVALO) 1 MG TABS tablet Take 1 tablet by mouth nightly 90 tablet 3    Cobalamin Combinations (NEURIVA PLUS PO) Take 1 tablet by mouth daily      Misc Natural Products (NEURIVA PO) Take by mouth      GARLIC PO Take by mouth      coenzyme Q10 100 MG CAPS capsule Take 200 mg by mouth daily       No current facility-administered medications for this visit.

## 2023-08-04 ASSESSMENT — LIFESTYLE VARIABLES
HOW MANY STANDARD DRINKS CONTAINING ALCOHOL DO YOU HAVE ON A TYPICAL DAY: PATIENT DOES NOT DRINK
HOW OFTEN DO YOU HAVE A DRINK CONTAINING ALCOHOL: NEVER

## 2023-08-04 ASSESSMENT — PATIENT HEALTH QUESTIONNAIRE - PHQ9
SUM OF ALL RESPONSES TO PHQ QUESTIONS 1-9: 0
SUM OF ALL RESPONSES TO PHQ9 QUESTIONS 1 & 2: 0
SUM OF ALL RESPONSES TO PHQ QUESTIONS 1-9: 0
1. LITTLE INTEREST OR PLEASURE IN DOING THINGS: 0
SUM OF ALL RESPONSES TO PHQ QUESTIONS 1-9: 0
SUM OF ALL RESPONSES TO PHQ QUESTIONS 1-9: 0
2. FEELING DOWN, DEPRESSED OR HOPELESS: 0

## 2023-08-05 NOTE — PROGRESS NOTES
[facility] CARDIOLOGY  [address] [facility]  [place], [address] [facility]  PHONE: [phone]        23        NAME:  Michelle Lopez  : 1950  MRN: [id number]     CHIEF COMPLAINT:    Shortness of Breath      SUBJECTIVE:       Last saw Dr. Kenya Moore 2019:  DM, HLP, SREE on CPAP. No Fhx of pCAD. Intol statin  Intol Farxiga    Pt made today's appt due to sob/holden. Present x 1 year but is worse. NYHA 2. No chest pain. BP and diabetes have beeen well controlled. Wears CPAP q hs. Medications were all reviewed with the patient today and updated as necessary. Current Outpatient Medications   Medication Sig    Evolocumab 140 MG/ML SOAJ Inject 140 mg into the skin every 14 days    TOUJEO SOLOSTAR 300 UNIT/ML concentrated injection pen Inject 40 Units into the skin at bedtime    OZEMPIC, 2 MG/DOSE, 8 MG/3ML SOPN Inject 2 mg into the skin once a week    metFORMIN (GLUCOPHAGE-XR) 500 MG extended release tablet Take 2 tablets by mouth 2 times daily (with meals)    glimepiride (AMARYL) 1 MG tablet Take 1 tablet by mouth 2 times daily (with meals)    BD PEN NEEDLE JESUS 2ND GEN 32G X 4 MM MISC Once a day    ONETOUCH VERIO strip 2 times a day    OneTouch Delica Lancets 60M MISC 2 times a day    fenofibrate (TRICOR) 145 MG tablet Take 1 tablet by mouth daily    ezetimibe (ZETIA) 10 MG tablet Take 1 tablet by mouth daily    Misc Natural Products (NEURIVA PO) Take by mouth    GARLIC PO Take by mouth    coenzyme Q10 100 MG CAPS capsule Take 2 capsules by mouth daily    VASCEPA 1 g CAPS capsule Take 2 capsules by mouth 2 times daily    pitavastatin (LIVALO) 1 MG TABS tablet Take 1 tablet by mouth nightly    Cobalamin Combinations (NEURIVA PLUS PO) Take 1 tablet by mouth daily     No current facility-administered medications for this visit.         Allergies   Allergen Reactions    Penicillins Hives           PHYSICAL EXAM:     Wt Readings from Last 3 Encounters:   23 250 lb (113.4 kg)   23 244 lb

## 2023-08-07 ENCOUNTER — OFFICE VISIT (OUTPATIENT)
Age: 73
End: 2023-08-07
Payer: MEDICARE

## 2023-08-07 VITALS
HEIGHT: 71 IN | SYSTOLIC BLOOD PRESSURE: 138 MMHG | DIASTOLIC BLOOD PRESSURE: 70 MMHG | HEART RATE: 72 BPM | BODY MASS INDEX: 35 KG/M2 | WEIGHT: 250 LBS

## 2023-08-07 DIAGNOSIS — Z78.9 STATIN INTOLERANCE: ICD-10-CM

## 2023-08-07 DIAGNOSIS — R06.09 DOE (DYSPNEA ON EXERTION): Primary | ICD-10-CM

## 2023-08-07 DIAGNOSIS — E78.5 DYSLIPIDEMIA: ICD-10-CM

## 2023-08-07 PROCEDURE — 1036F TOBACCO NON-USER: CPT | Performed by: INTERNAL MEDICINE

## 2023-08-07 PROCEDURE — G8417 CALC BMI ABV UP PARAM F/U: HCPCS | Performed by: INTERNAL MEDICINE

## 2023-08-07 PROCEDURE — G8428 CUR MEDS NOT DOCUMENT: HCPCS | Performed by: INTERNAL MEDICINE

## 2023-08-07 PROCEDURE — 93000 ELECTROCARDIOGRAM COMPLETE: CPT | Performed by: INTERNAL MEDICINE

## 2023-08-07 PROCEDURE — 3017F COLORECTAL CA SCREEN DOC REV: CPT | Performed by: INTERNAL MEDICINE

## 2023-08-07 PROCEDURE — 1123F ACP DISCUSS/DSCN MKR DOCD: CPT | Performed by: INTERNAL MEDICINE

## 2023-08-07 PROCEDURE — 99214 OFFICE O/P EST MOD 30 MIN: CPT | Performed by: INTERNAL MEDICINE

## 2023-08-07 SDOH — ECONOMIC STABILITY: INCOME INSECURITY: HOW HARD IS IT FOR YOU TO PAY FOR THE VERY BASICS LIKE FOOD, HOUSING, MEDICAL CARE, AND HEATING?: NOT VERY HARD

## 2023-08-07 SDOH — HEALTH STABILITY: PHYSICAL HEALTH: ON AVERAGE, HOW MANY DAYS PER WEEK DO YOU ENGAGE IN MODERATE TO STRENUOUS EXERCISE (LIKE A BRISK WALK)?: 3 DAYS

## 2023-08-07 SDOH — ECONOMIC STABILITY: FOOD INSECURITY: WITHIN THE PAST 12 MONTHS, YOU WORRIED THAT YOUR FOOD WOULD RUN OUT BEFORE YOU GOT MONEY TO BUY MORE.: NEVER TRUE

## 2023-08-07 SDOH — ECONOMIC STABILITY: TRANSPORTATION INSECURITY
IN THE PAST 12 MONTHS, HAS LACK OF TRANSPORTATION KEPT YOU FROM MEETINGS, WORK, OR FROM GETTING THINGS NEEDED FOR DAILY LIVING?: NO

## 2023-08-07 SDOH — HEALTH STABILITY: PHYSICAL HEALTH: ON AVERAGE, HOW MANY MINUTES DO YOU ENGAGE IN EXERCISE AT THIS LEVEL?: 30 MIN

## 2023-08-07 SDOH — ECONOMIC STABILITY: HOUSING INSECURITY
IN THE LAST 12 MONTHS, WAS THERE A TIME WHEN YOU DID NOT HAVE A STEADY PLACE TO SLEEP OR SLEPT IN A SHELTER (INCLUDING NOW)?: NO

## 2023-08-07 SDOH — ECONOMIC STABILITY: FOOD INSECURITY: WITHIN THE PAST 12 MONTHS, THE FOOD YOU BOUGHT JUST DIDN'T LAST AND YOU DIDN'T HAVE MONEY TO GET MORE.: NEVER TRUE

## 2023-08-07 ASSESSMENT — LIFESTYLE VARIABLES
HOW OFTEN DO YOU HAVE A DRINK CONTAINING ALCOHOL: 1
HOW OFTEN DO YOU HAVE SIX OR MORE DRINKS ON ONE OCCASION: 1
HOW OFTEN DO YOU HAVE A DRINK CONTAINING ALCOHOL: NEVER

## 2023-08-08 ENCOUNTER — OFFICE VISIT (OUTPATIENT)
Dept: INTERNAL MEDICINE CLINIC | Facility: CLINIC | Age: 73
End: 2023-08-08
Payer: MEDICARE

## 2023-08-08 VITALS
DIASTOLIC BLOOD PRESSURE: 74 MMHG | WEIGHT: 251 LBS | HEART RATE: 86 BPM | BODY MASS INDEX: 35.14 KG/M2 | SYSTOLIC BLOOD PRESSURE: 141 MMHG | RESPIRATION RATE: 18 BRPM | HEIGHT: 71 IN | TEMPERATURE: 97.2 F | OXYGEN SATURATION: 96 %

## 2023-08-08 DIAGNOSIS — I10 PRIMARY HYPERTENSION: ICD-10-CM

## 2023-08-08 DIAGNOSIS — Z00.00 MEDICARE ANNUAL WELLNESS VISIT, SUBSEQUENT: Primary | ICD-10-CM

## 2023-08-08 DIAGNOSIS — E66.01 SEVERE OBESITY (BMI 35.0-39.9) WITH COMORBIDITY (HCC): ICD-10-CM

## 2023-08-08 DIAGNOSIS — R53.83 FATIGUE, UNSPECIFIED TYPE: ICD-10-CM

## 2023-08-08 DIAGNOSIS — E11.65 UNCONTROLLED TYPE 2 DIABETES MELLITUS WITH HYPERGLYCEMIA, WITHOUT LONG-TERM CURRENT USE OF INSULIN (HCC): ICD-10-CM

## 2023-08-08 DIAGNOSIS — E78.2 MIXED HYPERLIPIDEMIA: ICD-10-CM

## 2023-08-08 DIAGNOSIS — M79.10 MYALGIA: ICD-10-CM

## 2023-08-08 DIAGNOSIS — Z79.899 LONG-TERM USE OF HIGH-RISK MEDICATION: ICD-10-CM

## 2023-08-08 LAB
BASOPHILS # BLD: 0.1 K/UL (ref 0–0.2)
BASOPHILS NFR BLD: 1 % (ref 0–2)
DIFFERENTIAL METHOD BLD: NORMAL
EOSINOPHIL # BLD: 0.1 K/UL (ref 0–0.8)
EOSINOPHIL NFR BLD: 2 % (ref 0.5–7.8)
ERYTHROCYTE [DISTWIDTH] IN BLOOD BY AUTOMATED COUNT: 13.1 % (ref 11.9–14.6)
ERYTHROCYTE [SEDIMENTATION RATE] IN BLOOD: 5 MM/HR
HCT VFR BLD AUTO: 42.6 % (ref 41.1–50.3)
HGB BLD-MCNC: 14.4 G/DL (ref 13.6–17.2)
IMM GRANULOCYTES # BLD AUTO: 0 K/UL (ref 0–0.5)
IMM GRANULOCYTES NFR BLD AUTO: 1 % (ref 0–5)
LYMPHOCYTES # BLD: 1.5 K/UL (ref 0.5–4.6)
LYMPHOCYTES NFR BLD: 27 % (ref 13–44)
MCH RBC QN AUTO: 28.9 PG (ref 26.1–32.9)
MCHC RBC AUTO-ENTMCNC: 33.8 G/DL (ref 31.4–35)
MCV RBC AUTO: 85.5 FL (ref 82–102)
MONOCYTES # BLD: 0.5 K/UL (ref 0.1–1.3)
MONOCYTES NFR BLD: 9 % (ref 4–12)
NEUTS SEG # BLD: 3.2 K/UL (ref 1.7–8.2)
NEUTS SEG NFR BLD: 60 % (ref 43–78)
NRBC # BLD: 0 K/UL (ref 0–0.2)
PLATELET # BLD AUTO: 281 K/UL (ref 150–450)
PMV BLD AUTO: 11 FL (ref 9.4–12.3)
RBC # BLD AUTO: 4.98 M/UL (ref 4.23–5.6)
WBC # BLD AUTO: 5.4 K/UL (ref 4.3–11.1)

## 2023-08-08 PROCEDURE — G8417 CALC BMI ABV UP PARAM F/U: HCPCS | Performed by: INTERNAL MEDICINE

## 2023-08-08 PROCEDURE — G8427 DOCREV CUR MEDS BY ELIG CLIN: HCPCS | Performed by: INTERNAL MEDICINE

## 2023-08-08 PROCEDURE — G0439 PPPS, SUBSEQ VISIT: HCPCS | Performed by: INTERNAL MEDICINE

## 2023-08-08 PROCEDURE — 3074F SYST BP LT 130 MM HG: CPT | Performed by: INTERNAL MEDICINE

## 2023-08-08 PROCEDURE — 1036F TOBACCO NON-USER: CPT | Performed by: INTERNAL MEDICINE

## 2023-08-08 PROCEDURE — 3046F HEMOGLOBIN A1C LEVEL >9.0%: CPT | Performed by: INTERNAL MEDICINE

## 2023-08-08 PROCEDURE — 99213 OFFICE O/P EST LOW 20 MIN: CPT | Performed by: INTERNAL MEDICINE

## 2023-08-08 PROCEDURE — 2022F DILAT RTA XM EVC RTNOPTHY: CPT | Performed by: INTERNAL MEDICINE

## 2023-08-08 PROCEDURE — 3017F COLORECTAL CA SCREEN DOC REV: CPT | Performed by: INTERNAL MEDICINE

## 2023-08-08 PROCEDURE — 3078F DIAST BP <80 MM HG: CPT | Performed by: INTERNAL MEDICINE

## 2023-08-08 PROCEDURE — 1123F ACP DISCUSS/DSCN MKR DOCD: CPT | Performed by: INTERNAL MEDICINE

## 2023-08-08 NOTE — PROGRESS NOTES
08/08/2023   Location:[address] INTERNAL MEDICINE  [place]  Patient #:  [id number]  YOB: 1950        History of Present Illness     Chief Complaint   Patient presents with    Medicare AWV     Subsequent     Follow-up Chronic Condition     6 month f/u    Fatigue     Been getting worse the past year       Mr. Zhen Carrillo is a 72 y.o. male  who presents for This is a combined medical follow up office visit and a Medicare Wellness Visit. The Wellness note has been reviewed. Follow up on chronic medical issues. There is compliance and tolerance with medications. Chronic active medical issues DM, HTN, HLD reports tolerance to meds and compliance. He is under the care of endocrinologist for his diabetes. He is under the care of pulmonologist for SREE    Worseing fatigue. Sleeping with CPAP every night.   'reports his shoulders wake him up hurting. Left arm numbness when he is still at night. Thinks if he miss a dose his readings are better.    On Sundays ehen heboth injections he thinks his sugar reading in higher    Last Labs  CBC:   Lab Results   Component Value Date/Time    WBC 5.4 08/08/2023 03:19 PM    RBC 4.98 08/08/2023 03:19 PM    HGB 14.4 08/08/2023 03:19 PM    HCT 42.6 08/08/2023 03:19 PM    MCV 85.5 08/08/2023 03:19 PM    MCH 28.9 08/08/2023 03:19 PM    MCHC 33.8 08/08/2023 03:19 PM    RDW 13.1 08/08/2023 03:19 PM     08/08/2023 03:19 PM    MPV 11.0 08/08/2023 03:19 PM     CMP:    Lab Results   Component Value Date/Time     02/14/2022 11:14 AM    K 5.3 02/14/2022 11:14 AM     02/14/2022 11:14 AM    CO2 22 02/14/2022 11:14 AM    BUN 21 02/14/2022 11:14 AM    CREATININE 1.33 02/14/2022 11:14 AM    GFRAA 63 02/14/2022 11:14 AM    AGRATIO 2.4 02/14/2022 11:14 AM    GLUCOSE 101 02/14/2022 11:14 AM    PROT 6.8 02/14/2022 11:14 AM    LABALBU 4.8 02/14/2022 11:14 AM    CALCIUM 9.4 02/14/2022 11:14 AM    BILITOT 0.4 02/14/2022 11:14 AM    ALKPHOS

## 2023-08-08 NOTE — PATIENT INSTRUCTIONS
health care agent? Choose your health care agent carefully. This person may or may not be a family member. Talk to the person before you make your final decision. Make sure this person is comfortable with this responsibility. It's a good idea to choose someone who:  Is at least 25years old. Knows you well and understands what makes life meaningful for you. Understands your Confucianist and moral values. Will do what you want, not what that person wants. Will be able to make difficult choices at a stressful time. Will be able to refuse or stop treatment, if that is what you would want, even if you could die. Will be firm and confident with health professionals if needed. Will ask questions to get needed information. Lives near you or agrees to travel to you if needed. Your family may help you make medical decisions while you can still be part of that process. But it's important to choose one person to be your health care agent in case you aren't able to make decisions for yourself. If you don't fill out the legal form and name a health care agent, the decisions your family can make may be limited. A health care agent may be called something else in your state. Who will make decisions for you if you don't have a health care agent? If you don't have a health care agent or a living will, you may not get the care you want. Decisions may be made by family members who disagree about your medical care. Or decisions may be made by a medical professional who doesn't know you well. In some cases, a  makes the decisions. When you name a health care agent, it is very clear who has the power to make health decisions for you. How do you name a health care agent? You name your health care agent on a legal form. This form is usually called a medical power of . Ask your hospital, state bar association, or office on aging where to find these forms.   You must sign the form to make it legal. Some states

## 2023-08-09 LAB
CK SERPL-CCNC: 185 U/L (ref 21–215)
CRP SERPL-MCNC: <0.3 MG/DL (ref 0–0.9)
VIT B12 SERPL-MCNC: 210 PG/ML (ref 193–986)

## 2023-08-14 ENCOUNTER — TELEPHONE (OUTPATIENT)
Dept: INTERNAL MEDICINE CLINIC | Facility: CLINIC | Age: 73
End: 2023-08-14

## 2023-08-14 ASSESSMENT — ENCOUNTER SYMPTOMS
BLOOD IN STOOL: 0
ABDOMINAL PAIN: 0
CONSTIPATION: 0
COUGH: 0
SHORTNESS OF BREATH: 0
DIARRHEA: 0

## 2023-08-15 NOTE — TELEPHONE ENCOUNTER
Recent labs were reviewed. Glucose/Blood sugar was normal.  Kidney function, liver function and thyroid function  were normal.  Vitamin B12 is low normal range.    Recommend taking vitamin b12 100mcg daily   Tests for inflammation were normal.   Test for muscle damage was normal.   Tammy Cunningham MD

## 2023-08-16 ENCOUNTER — TELEPHONE (OUTPATIENT)
Dept: INTERNAL MEDICINE CLINIC | Facility: CLINIC | Age: 73
End: 2023-08-16

## 2023-08-16 NOTE — TELEPHONE ENCOUNTER
----- Message from Tracey Almaraz MD sent at 8/16/2023  6:39 AM EDT -----  Labs were okay. Inflammatory markers and test evaluating for muscle damage was negative.    Tracey Alamraz MD

## 2023-08-29 LAB
ALBUMIN SERPL-MCNC: 4.5 G/DL (ref 3.8–4.8)
ALBUMIN/CREAT UR: 8 MG/G CREAT (ref 0–29)
ALBUMIN/GLOB SERPL: 2 {RATIO} (ref 1.2–2.2)
ALP SERPL-CCNC: 54 IU/L (ref 44–121)
ALT SERPL-CCNC: 29 IU/L (ref 0–44)
AST SERPL-CCNC: 19 IU/L (ref 0–40)
BILIRUB SERPL-MCNC: 0.4 MG/DL (ref 0–1.2)
BUN SERPL-MCNC: 23 MG/DL (ref 8–27)
BUN/CREAT SERPL: 17 (ref 10–24)
CALCIUM SERPL-MCNC: 9.6 MG/DL (ref 8.6–10.2)
CHLORIDE SERPL-SCNC: 103 MMOL/L (ref 96–106)
CHOLEST SERPL-MCNC: 210 MG/DL (ref 100–199)
CO2 SERPL-SCNC: 20 MMOL/L (ref 20–29)
CREAT SERPL-MCNC: 1.33 MG/DL (ref 0.76–1.27)
CREAT UR-MCNC: 150.5 MG/DL
EGFRCR SERPLBLD CKD-EPI 2021: 57 ML/MIN/1.73
GLOBULIN SER CALC-MCNC: 2.2 G/DL (ref 1.5–4.5)
GLUCOSE SERPL-MCNC: 155 MG/DL (ref 70–99)
HBA1C MFR BLD: 7.8 % (ref 4.8–5.6)
HDLC SERPL-MCNC: 45 MG/DL
LDLC SERPL CALC-MCNC: 115 MG/DL (ref 0–99)
MICROALBUMIN UR-MCNC: 12.7 UG/ML
POTASSIUM SERPL-SCNC: 4.7 MMOL/L (ref 3.5–5.2)
PROT SERPL-MCNC: 6.7 G/DL (ref 6–8.5)
SODIUM SERPL-SCNC: 140 MMOL/L (ref 134–144)
SPECIMEN STATUS REPORT: NORMAL
TRIGL SERPL-MCNC: 290 MG/DL (ref 0–149)
TSH SERPL DL<=0.005 MIU/L-ACNC: 2.73 UIU/ML (ref 0.45–4.5)
VLDLC SERPL CALC-MCNC: 50 MG/DL (ref 5–40)

## 2023-09-07 ENCOUNTER — OFFICE VISIT (OUTPATIENT)
Dept: ENDOCRINOLOGY | Age: 73
End: 2023-09-07
Payer: MEDICARE

## 2023-09-07 VITALS
HEART RATE: 79 BPM | DIASTOLIC BLOOD PRESSURE: 68 MMHG | BODY MASS INDEX: 34.45 KG/M2 | OXYGEN SATURATION: 95 % | SYSTOLIC BLOOD PRESSURE: 116 MMHG | WEIGHT: 247 LBS

## 2023-09-07 DIAGNOSIS — E11.65 UNCONTROLLED TYPE 2 DIABETES MELLITUS WITH HYPERGLYCEMIA, WITHOUT LONG-TERM CURRENT USE OF INSULIN (HCC): Primary | ICD-10-CM

## 2023-09-07 DIAGNOSIS — E78.2 MIXED HYPERLIPIDEMIA: ICD-10-CM

## 2023-09-07 PROCEDURE — G8417 CALC BMI ABV UP PARAM F/U: HCPCS | Performed by: INTERNAL MEDICINE

## 2023-09-07 PROCEDURE — 1036F TOBACCO NON-USER: CPT | Performed by: INTERNAL MEDICINE

## 2023-09-07 PROCEDURE — 3017F COLORECTAL CA SCREEN DOC REV: CPT | Performed by: INTERNAL MEDICINE

## 2023-09-07 PROCEDURE — G8427 DOCREV CUR MEDS BY ELIG CLIN: HCPCS | Performed by: INTERNAL MEDICINE

## 2023-09-07 PROCEDURE — 99214 OFFICE O/P EST MOD 30 MIN: CPT | Performed by: INTERNAL MEDICINE

## 2023-09-07 PROCEDURE — 2022F DILAT RTA XM EVC RTNOPTHY: CPT | Performed by: INTERNAL MEDICINE

## 2023-09-07 PROCEDURE — 3051F HG A1C>EQUAL 7.0%<8.0%: CPT | Performed by: INTERNAL MEDICINE

## 2023-09-07 PROCEDURE — 1123F ACP DISCUSS/DSCN MKR DOCD: CPT | Performed by: INTERNAL MEDICINE

## 2023-09-07 RX ORDER — BLOOD SUGAR DIAGNOSTIC
STRIP MISCELLANEOUS
Qty: 100 EACH | Refills: 11 | Status: SHIPPED | OUTPATIENT
Start: 2023-09-07

## 2023-09-07 RX ORDER — GLIMEPIRIDE 1 MG/1
1 TABLET ORAL 2 TIMES DAILY WITH MEALS
Qty: 60 TABLET | Refills: 11 | Status: SHIPPED | OUTPATIENT
Start: 2023-09-07

## 2023-09-07 RX ORDER — FENOFIBRATE 145 MG/1
145 TABLET, COATED ORAL DAILY
Qty: 30 TABLET | Refills: 11 | Status: SHIPPED | OUTPATIENT
Start: 2023-09-07

## 2023-09-07 RX ORDER — ICOSAPENT ETHYL 1000 MG/1
CAPSULE ORAL
COMMUNITY
Start: 2023-08-25 | End: 2023-09-07 | Stop reason: SDUPTHER

## 2023-09-07 RX ORDER — PEN NEEDLE, DIABETIC 32GX 5/32"
NEEDLE, DISPOSABLE MISCELLANEOUS
Qty: 50 EACH | Refills: 11 | Status: SHIPPED | OUTPATIENT
Start: 2023-09-07

## 2023-09-07 RX ORDER — INSULIN GLARGINE 300 U/ML
40 INJECTION, SOLUTION SUBCUTANEOUS NIGHTLY
Qty: 4.5 ML | Refills: 11 | Status: SHIPPED | OUTPATIENT
Start: 2023-09-07

## 2023-09-07 RX ORDER — EZETIMIBE 10 MG/1
10 TABLET ORAL DAILY
Qty: 30 TABLET | Refills: 11 | Status: SHIPPED | OUTPATIENT
Start: 2023-09-07

## 2023-09-07 RX ORDER — ICOSAPENT ETHYL 1000 MG/1
2 CAPSULE ORAL 2 TIMES DAILY
Qty: 120 CAPSULE | Refills: 11 | Status: SHIPPED | OUTPATIENT
Start: 2023-09-07

## 2023-09-07 RX ORDER — LANCETS 30 GAUGE
EACH MISCELLANEOUS
Qty: 100 EACH | Refills: 11 | Status: SHIPPED | OUTPATIENT
Start: 2023-09-07

## 2023-09-07 RX ORDER — METFORMIN HYDROCHLORIDE 500 MG/1
1000 TABLET, EXTENDED RELEASE ORAL 2 TIMES DAILY WITH MEALS
Qty: 120 TABLET | Refills: 11 | Status: SHIPPED | OUTPATIENT
Start: 2023-09-07

## 2023-09-07 RX ORDER — TIRZEPATIDE 5 MG/.5ML
5 INJECTION, SOLUTION SUBCUTANEOUS WEEKLY
Qty: 2 ML | Refills: 3 | Status: SHIPPED | OUTPATIENT
Start: 2023-09-07

## 2023-09-07 RX ORDER — CHOLECALCIFEROL (VITAMIN D3) 25 MCG
TABLET,CHEWABLE ORAL
COMMUNITY

## 2023-09-07 ASSESSMENT — ENCOUNTER SYMPTOMS: SHORTNESS OF BREATH: 1

## 2023-09-14 ENCOUNTER — OFFICE VISIT (OUTPATIENT)
Age: 73
End: 2023-09-14
Payer: MEDICARE

## 2023-09-14 VITALS
WEIGHT: 248 LBS | BODY MASS INDEX: 34.72 KG/M2 | HEART RATE: 68 BPM | HEIGHT: 71 IN | SYSTOLIC BLOOD PRESSURE: 123 MMHG | DIASTOLIC BLOOD PRESSURE: 79 MMHG

## 2023-09-14 DIAGNOSIS — R06.09 DOE (DYSPNEA ON EXERTION): Primary | ICD-10-CM

## 2023-09-14 DIAGNOSIS — E78.5 DYSLIPIDEMIA: ICD-10-CM

## 2023-09-14 PROCEDURE — 99214 OFFICE O/P EST MOD 30 MIN: CPT | Performed by: INTERNAL MEDICINE

## 2023-09-14 PROCEDURE — 1123F ACP DISCUSS/DSCN MKR DOCD: CPT | Performed by: INTERNAL MEDICINE

## 2023-09-14 PROCEDURE — G8427 DOCREV CUR MEDS BY ELIG CLIN: HCPCS | Performed by: INTERNAL MEDICINE

## 2023-09-14 PROCEDURE — G8417 CALC BMI ABV UP PARAM F/U: HCPCS | Performed by: INTERNAL MEDICINE

## 2023-09-14 PROCEDURE — 1036F TOBACCO NON-USER: CPT | Performed by: INTERNAL MEDICINE

## 2023-09-14 PROCEDURE — 3017F COLORECTAL CA SCREEN DOC REV: CPT | Performed by: INTERNAL MEDICINE

## 2023-09-14 ASSESSMENT — ENCOUNTER SYMPTOMS
ABDOMINAL PAIN: 0
SHORTNESS OF BREATH: 0
BLOATING: 0
BACK PAIN: 0
BLURRED VISION: 0
HEMOPTYSIS: 0
NAUSEA: 0
COUGH: 0
VOMITING: 0
DOUBLE VISION: 0
ORTHOPNEA: 0

## 2023-09-14 NOTE — PROGRESS NOTES
Crestor/Lipitor. Cost issues with bempedoic acid/PCSK9 inhibitors with insurance coverage. Attempt Livalo. Elevated triglycerides likely contributed by diabetes mellitus and would expect improvement with better diabetes mellitus control  Last LDL noted at 115 from 8/28/23; triglycerides at 290    DM- sees endocrine; last hemoglobin A1c at 7.8 from 8/20/23    Sleep apnea-stressed compliance; currently compliant with CPAP. Myalgias-prior resolved off gemfibrozil; currently states intermittent leg pain with ambulation but good pulses. Likely arthritic component    Return in about 2 months (around 11/14/2023).      Prasanth Hercules MD  9/14/2023  10:10 AM

## 2023-11-16 ENCOUNTER — OFFICE VISIT (OUTPATIENT)
Age: 73
End: 2023-11-16
Payer: MEDICARE

## 2023-11-16 VITALS
HEIGHT: 71 IN | BODY MASS INDEX: 33.88 KG/M2 | DIASTOLIC BLOOD PRESSURE: 68 MMHG | SYSTOLIC BLOOD PRESSURE: 136 MMHG | WEIGHT: 242 LBS | HEART RATE: 80 BPM

## 2023-11-16 DIAGNOSIS — R06.09 DOE (DYSPNEA ON EXERTION): ICD-10-CM

## 2023-11-16 DIAGNOSIS — E78.5 DYSLIPIDEMIA: Primary | ICD-10-CM

## 2023-11-16 PROCEDURE — 99214 OFFICE O/P EST MOD 30 MIN: CPT | Performed by: INTERNAL MEDICINE

## 2023-11-16 PROCEDURE — G8484 FLU IMMUNIZE NO ADMIN: HCPCS | Performed by: INTERNAL MEDICINE

## 2023-11-16 PROCEDURE — 3017F COLORECTAL CA SCREEN DOC REV: CPT | Performed by: INTERNAL MEDICINE

## 2023-11-16 PROCEDURE — 1036F TOBACCO NON-USER: CPT | Performed by: INTERNAL MEDICINE

## 2023-11-16 PROCEDURE — G8417 CALC BMI ABV UP PARAM F/U: HCPCS | Performed by: INTERNAL MEDICINE

## 2023-11-16 PROCEDURE — 1123F ACP DISCUSS/DSCN MKR DOCD: CPT | Performed by: INTERNAL MEDICINE

## 2023-11-16 PROCEDURE — G8427 DOCREV CUR MEDS BY ELIG CLIN: HCPCS | Performed by: INTERNAL MEDICINE

## 2023-11-16 ASSESSMENT — ENCOUNTER SYMPTOMS
ORTHOPNEA: 0
HEMOPTYSIS: 0
BACK PAIN: 0
COUGH: 0
DOUBLE VISION: 0
BLURRED VISION: 0
BLOATING: 0
NAUSEA: 0
SHORTNESS OF BREATH: 0
VOMITING: 0
ABDOMINAL PAIN: 0

## 2023-11-16 NOTE — PROGRESS NOTES
sees endocrine; last hemoglobin A1c at 7.8 from 8/20/23    Sleep apnea-stressed compliance; currently compliant with CPAP. Myalgias-prior resolved off gemfibrozil; currently states intermittent leg pain with ambulation but good pulses. Likely arthritic component    Return in about 6 months (around 5/16/2024).      Prasanth Hercules MD  11/16/2023  10:14 AM

## 2023-11-17 ENCOUNTER — TELEPHONE (OUTPATIENT)
Age: 73
End: 2023-11-17

## 2023-11-17 NOTE — TELEPHONE ENCOUNTER
Patient has set up account with [facility] [employer] direct.   ID# [id number]  Send in new prescription for  [facility]  And he is also asking for Viagra

## 2023-12-27 DIAGNOSIS — E11.65 UNCONTROLLED TYPE 2 DIABETES MELLITUS WITH HYPERGLYCEMIA, WITHOUT LONG-TERM CURRENT USE OF INSULIN (HCC): ICD-10-CM

## 2023-12-27 RX ORDER — TIRZEPATIDE 5 MG/.5ML
INJECTION, SOLUTION SUBCUTANEOUS
Qty: 2 ML | Refills: 0 | Status: SHIPPED | OUTPATIENT
Start: 2023-12-27

## 2023-12-28 DIAGNOSIS — E11.65 UNCONTROLLED TYPE 2 DIABETES MELLITUS WITH HYPERGLYCEMIA, WITHOUT LONG-TERM CURRENT USE OF INSULIN (HCC): ICD-10-CM

## 2023-12-28 RX ORDER — TIRZEPATIDE 5 MG/.5ML
INJECTION, SOLUTION SUBCUTANEOUS
Qty: 2 ML | Refills: 3 | OUTPATIENT
Start: 2023-12-28

## 2023-12-29 DIAGNOSIS — E11.65 UNCONTROLLED TYPE 2 DIABETES MELLITUS WITH HYPERGLYCEMIA, WITHOUT LONG-TERM CURRENT USE OF INSULIN (HCC): ICD-10-CM

## 2023-12-29 RX ORDER — TIRZEPATIDE 5 MG/.5ML
INJECTION, SOLUTION SUBCUTANEOUS
Qty: 2 ML | Refills: 3 | OUTPATIENT
Start: 2023-12-29

## 2024-01-16 ENCOUNTER — OFFICE VISIT (OUTPATIENT)
Dept: ENDOCRINOLOGY | Age: 74
End: 2024-01-16
Payer: MEDICARE

## 2024-01-16 VITALS
SYSTOLIC BLOOD PRESSURE: 132 MMHG | DIASTOLIC BLOOD PRESSURE: 70 MMHG | WEIGHT: 246 LBS | BODY MASS INDEX: 34.31 KG/M2 | OXYGEN SATURATION: 97 % | HEART RATE: 64 BPM

## 2024-01-16 DIAGNOSIS — E11.65 UNCONTROLLED TYPE 2 DIABETES MELLITUS WITH HYPERGLYCEMIA, WITHOUT LONG-TERM CURRENT USE OF INSULIN (HCC): Primary | ICD-10-CM

## 2024-01-16 DIAGNOSIS — E78.2 MIXED HYPERLIPIDEMIA: ICD-10-CM

## 2024-01-16 LAB — HBA1C MFR BLD: 7.2 %

## 2024-01-16 PROCEDURE — 83036 HEMOGLOBIN GLYCOSYLATED A1C: CPT | Performed by: INTERNAL MEDICINE

## 2024-01-16 PROCEDURE — G8417 CALC BMI ABV UP PARAM F/U: HCPCS | Performed by: INTERNAL MEDICINE

## 2024-01-16 PROCEDURE — 3046F HEMOGLOBIN A1C LEVEL >9.0%: CPT | Performed by: INTERNAL MEDICINE

## 2024-01-16 PROCEDURE — 99214 OFFICE O/P EST MOD 30 MIN: CPT | Performed by: INTERNAL MEDICINE

## 2024-01-16 PROCEDURE — 2022F DILAT RTA XM EVC RTNOPTHY: CPT | Performed by: INTERNAL MEDICINE

## 2024-01-16 PROCEDURE — G8484 FLU IMMUNIZE NO ADMIN: HCPCS | Performed by: INTERNAL MEDICINE

## 2024-01-16 PROCEDURE — G8427 DOCREV CUR MEDS BY ELIG CLIN: HCPCS | Performed by: INTERNAL MEDICINE

## 2024-01-16 PROCEDURE — 1036F TOBACCO NON-USER: CPT | Performed by: INTERNAL MEDICINE

## 2024-01-16 PROCEDURE — 3017F COLORECTAL CA SCREEN DOC REV: CPT | Performed by: INTERNAL MEDICINE

## 2024-01-16 PROCEDURE — 1123F ACP DISCUSS/DSCN MKR DOCD: CPT | Performed by: INTERNAL MEDICINE

## 2024-01-16 RX ORDER — GLIMEPIRIDE 1 MG/1
1 TABLET ORAL 2 TIMES DAILY WITH MEALS
Qty: 180 TABLET | Refills: 3 | Status: SHIPPED | OUTPATIENT
Start: 2024-01-16

## 2024-01-16 RX ORDER — LANCETS 30 GAUGE
EACH MISCELLANEOUS
Qty: 200 EACH | Refills: 11 | Status: SHIPPED | OUTPATIENT
Start: 2024-01-16

## 2024-01-16 RX ORDER — TIRZEPATIDE 7.5 MG/.5ML
7.5 INJECTION, SOLUTION SUBCUTANEOUS WEEKLY
Qty: 2 ML | Refills: 0 | Status: SHIPPED | OUTPATIENT
Start: 2024-01-16 | End: 2024-02-07

## 2024-01-16 RX ORDER — BLOOD SUGAR DIAGNOSTIC
STRIP MISCELLANEOUS
Qty: 200 EACH | Refills: 11 | Status: SHIPPED | OUTPATIENT
Start: 2024-01-16

## 2024-01-16 RX ORDER — PITAVASTATIN 2 MG/1
2 TABLET, FILM COATED ORAL NIGHTLY
Qty: 90 TABLET | Refills: 3
Start: 2024-01-16

## 2024-01-16 RX ORDER — INSULIN GLARGINE 300 U/ML
40 INJECTION, SOLUTION SUBCUTANEOUS NIGHTLY
Qty: 13.5 ML | Refills: 3 | Status: SHIPPED | OUTPATIENT
Start: 2024-01-16

## 2024-01-16 RX ORDER — FENOFIBRATE 145 MG/1
145 TABLET, COATED ORAL DAILY
Qty: 90 TABLET | Refills: 3 | Status: SHIPPED | OUTPATIENT
Start: 2024-01-16

## 2024-01-16 RX ORDER — METFORMIN HYDROCHLORIDE 500 MG/1
1000 TABLET, EXTENDED RELEASE ORAL 2 TIMES DAILY WITH MEALS
Qty: 360 TABLET | Refills: 3 | Status: SHIPPED | OUTPATIENT
Start: 2024-01-16

## 2024-01-16 RX ORDER — ICOSAPENT ETHYL 1000 MG/1
2 CAPSULE ORAL 2 TIMES DAILY
Qty: 360 CAPSULE | Refills: 3 | Status: SHIPPED | OUTPATIENT
Start: 2024-01-16

## 2024-01-16 RX ORDER — TIRZEPATIDE 10 MG/.5ML
10 INJECTION, SOLUTION SUBCUTANEOUS WEEKLY
Qty: 6 ML | Refills: 3 | Status: SHIPPED | OUTPATIENT
Start: 2024-01-16

## 2024-01-16 RX ORDER — EZETIMIBE 10 MG/1
10 TABLET ORAL DAILY
Qty: 90 TABLET | Refills: 3 | Status: SHIPPED | OUTPATIENT
Start: 2024-01-16

## 2024-01-16 RX ORDER — PEN NEEDLE, DIABETIC 32GX 5/32"
NEEDLE, DISPOSABLE MISCELLANEOUS
Qty: 100 EACH | Refills: 3 | Status: SHIPPED | OUTPATIENT
Start: 2024-01-16

## 2024-01-16 ASSESSMENT — ENCOUNTER SYMPTOMS
DIARRHEA: 0
SHORTNESS OF BREATH: 0
VOMITING: 0
CONSTIPATION: 0
NAUSEA: 0

## 2024-01-16 NOTE — PROGRESS NOTES
urine microalbumin/creatinine 9.  2/14/2022: Creatinine 1.33 (GFR 54).  8/18/2022: Creatinine 1.33 (GFR 58), urine albumin/creatinine 7.  8/28/2023: Creatinine 1.33 (GFR 57), urine microalbumin/creatinine 8.     Neuropathy: Denies burning, numbness, tingling of feet.      Retinopathy: Eye exam 5/2023 - no retinopathy.     Lipids: Gemfibrozil stopped due to arthralgias.  He was having some myalgias in his hips on pravastatin.  His cardiologist changed him to atorvastatin.  He is also taking fenofibrate.  His PCP changed him from atorvastatin to rosuvastatin due to myalgias.  He stopped rosuvastatin due to cramps.  His insurance would not cover Nexletol, Nexlizet, Livalo or Repatha.  His cardiologist started him on pitavastatin.  He has been tolerating it except that it has caused his blood glucoses to be higher.  3/11/2016: Total cholesterol 295, triglycerides 1618, HDL 35.   6/24/2016: Total cholesterol 238, triglycerides 399, HDL 35.  9/30/2016: Total cholesterol 259, triglycerides 684, HDL 42.  1/6/2017: Total cholesterol 209, triglycerides 330, HDL 45, LDL 98.  4/6/2017: Total cholesterol 194, triglycerides 390, HDL 41, LDL 75.  10/23/2017: Total cholesterol 162, triglycerides 264, HDL 41, LDL 68.  4/19/2018: Total cholesterol 172, triglycerides 232, HDL 40, LDL 86.  8/7/2018: Total cholesterol 157, triglycerides 284, HDL 41, LDL 59.  2/8/2019: Total cholesterol 154, triglycerides 194, HDL 41, LDL 74, VLDL 39.  5/14/2019: Total cholesterol 175, triglycerides 298, HDL 37, LDL 78, VLDL 60.  11/21/2019: Total cholesterol 160, triglycerides 352, HDL 44, LDL 46, VLDL 70.  9/15/2020: Total cholesterol 237, triglycerides 448, HDL 40.  5/21/2021: Total cholesterol 240, triglycerides 391, HDL 40, , VLDL 70.  10/4/2021: Total cholesterol 245, triglycerides 290, HDL 45, , VLDL 53.  2/14/2022: Total cholesterol 264, triglycerides 253, HDL 46, , VLDL 48.  8/18/2022: Total cholesterol 201, triglycerides

## 2024-01-23 RX ORDER — TIRZEPATIDE 5 MG/.5ML
INJECTION, SOLUTION SUBCUTANEOUS
Qty: 2 ML | Refills: 0 | OUTPATIENT
Start: 2024-01-23

## 2024-02-11 DIAGNOSIS — E11.65 UNCONTROLLED TYPE 2 DIABETES MELLITUS WITH HYPERGLYCEMIA, WITHOUT LONG-TERM CURRENT USE OF INSULIN (HCC): ICD-10-CM

## 2024-02-12 RX ORDER — TIRZEPATIDE 7.5 MG/.5ML
INJECTION, SOLUTION SUBCUTANEOUS
Qty: 2 ML | Refills: 0 | OUTPATIENT
Start: 2024-02-12

## 2024-02-16 ENCOUNTER — OFFICE VISIT (OUTPATIENT)
Dept: INTERNAL MEDICINE CLINIC | Facility: CLINIC | Age: 74
End: 2024-02-16

## 2024-02-16 VITALS
SYSTOLIC BLOOD PRESSURE: 141 MMHG | OXYGEN SATURATION: 98 % | WEIGHT: 248 LBS | BODY MASS INDEX: 34.72 KG/M2 | DIASTOLIC BLOOD PRESSURE: 75 MMHG | TEMPERATURE: 97.4 F | HEIGHT: 71 IN | HEART RATE: 82 BPM

## 2024-02-16 DIAGNOSIS — G47.33 OBSTRUCTIVE SLEEP APNEA SYNDROME: ICD-10-CM

## 2024-02-16 DIAGNOSIS — E11.65 UNCONTROLLED TYPE 2 DIABETES MELLITUS WITH HYPERGLYCEMIA, WITHOUT LONG-TERM CURRENT USE OF INSULIN (HCC): ICD-10-CM

## 2024-02-16 DIAGNOSIS — E66.09 CLASS 1 OBESITY DUE TO EXCESS CALORIES WITH SERIOUS COMORBIDITY AND BODY MASS INDEX (BMI) OF 34.0 TO 34.9 IN ADULT: ICD-10-CM

## 2024-02-16 DIAGNOSIS — I10 PRIMARY HYPERTENSION: Primary | ICD-10-CM

## 2024-02-16 NOTE — PROGRESS NOTES
02/16/2024   Location:[facility] PHYSICIAN SERVICES  [facility] INTERNAL MEDICINE  [place]  Patient #:  [id number]  YOB: 1950        History of Present Illness     Chief Complaint   Patient presents with    6 Month Follow-Up     6 month follow-up    Ear Fullness     Pt reports occasional dizziness with some ear fullness       Mr. Henderson is a 73 y.o. male  who presents for Follow up on chronic medical issues. There is compliance and tolerance with medications.    Chronic active medical issues DM, HTN, HLD reports tolerance to meds and compliance.   He is under the care of endocrinologist for his diabetes.   He is under the care of pulmonologist for SREE     Worseing fatigue. Sleeping with CPAP every night.     Was having vertigo last year for about 2 days.   Occasionally feels like balance is off.   Right ear sometimes feel like it has fluid in  Tolerating Mounjaro but no significant wt loss noted base on scales in the office.     Last Labs  CBC:   Lab Results   Component Value Date/Time    WBC 5.4 08/08/2023 03:19 PM    RBC 4.98 08/08/2023 03:19 PM    HGB 14.4 08/08/2023 03:19 PM    HCT 42.6 08/08/2023 03:19 PM    MCV 85.5 08/08/2023 03:19 PM    MCH 28.9 08/08/2023 03:19 PM    MCHC 33.8 08/08/2023 03:19 PM    RDW 13.1 08/08/2023 03:19 PM     08/08/2023 03:19 PM    MPV 11.0 08/08/2023 03:19 PM     CMP:    Lab Results   Component Value Date/Time     08/28/2023 08:47 AM    K 4.7 08/28/2023 08:47 AM     08/28/2023 08:47 AM    CO2 20 08/28/2023 08:47 AM    BUN 23 08/28/2023 08:47 AM    CREATININE 1.33 08/28/2023 08:47 AM    GFRAA 63 02/14/2022 11:14 AM    AGRATIO 2.0 08/28/2023 08:47 AM    LABGLOM 57 08/28/2023 08:47 AM    GLUCOSE 155 08/28/2023 08:47 AM    PROT 6.7 08/28/2023 08:47 AM    LABALBU 4.5 08/28/2023 08:47 AM    LABALBU 12.7 08/28/2023 08:47 AM    CALCIUM 9.6 08/28/2023 08:47 AM    BILITOT 0.4 08/28/2023 08:47 AM    ALKPHOS 54 08/28/2023 08:47 AM    AST 19 08/28/2023 08:47

## 2024-03-14 DIAGNOSIS — E11.65 UNCONTROLLED TYPE 2 DIABETES MELLITUS WITH HYPERGLYCEMIA, WITHOUT LONG-TERM CURRENT USE OF INSULIN (HCC): ICD-10-CM

## 2024-03-14 RX ORDER — INSULIN GLARGINE 300 U/ML
INJECTION, SOLUTION SUBCUTANEOUS
Qty: 4.5 ML | Refills: 11 | OUTPATIENT
Start: 2024-03-14

## 2024-03-15 DIAGNOSIS — E11.65 UNCONTROLLED TYPE 2 DIABETES MELLITUS WITH HYPERGLYCEMIA, WITHOUT LONG-TERM CURRENT USE OF INSULIN (HCC): ICD-10-CM

## 2024-03-15 RX ORDER — INSULIN GLARGINE 300 U/ML
INJECTION, SOLUTION SUBCUTANEOUS
Qty: 4.5 ML | Refills: 11 | OUTPATIENT
Start: 2024-03-15

## 2024-03-16 DIAGNOSIS — E11.65 UNCONTROLLED TYPE 2 DIABETES MELLITUS WITH HYPERGLYCEMIA, WITHOUT LONG-TERM CURRENT USE OF INSULIN (HCC): ICD-10-CM

## 2024-03-18 RX ORDER — INSULIN GLARGINE 300 U/ML
INJECTION, SOLUTION SUBCUTANEOUS
Qty: 4.5 ML | Refills: 11 | OUTPATIENT
Start: 2024-03-18

## 2024-04-09 ENCOUNTER — PATIENT MESSAGE (OUTPATIENT)
Dept: ENDOCRINOLOGY | Age: 74
End: 2024-04-09

## 2024-04-11 RX ORDER — TIRZEPATIDE 7.5 MG/.5ML
7.5 INJECTION, SOLUTION SUBCUTANEOUS WEEKLY
Qty: 6 ML | Refills: 3 | Status: SHIPPED | OUTPATIENT
Start: 2024-04-11

## 2024-05-21 ENCOUNTER — OFFICE VISIT (OUTPATIENT)
Dept: ENDOCRINOLOGY | Age: 74
End: 2024-05-21
Payer: MEDICARE

## 2024-05-21 VITALS
WEIGHT: 247 LBS | BODY MASS INDEX: 34.45 KG/M2 | SYSTOLIC BLOOD PRESSURE: 122 MMHG | OXYGEN SATURATION: 97 % | DIASTOLIC BLOOD PRESSURE: 68 MMHG | HEART RATE: 65 BPM

## 2024-05-21 DIAGNOSIS — E11.65 UNCONTROLLED TYPE 2 DIABETES MELLITUS WITH HYPERGLYCEMIA, WITHOUT LONG-TERM CURRENT USE OF INSULIN (HCC): Primary | ICD-10-CM

## 2024-05-21 DIAGNOSIS — E78.2 MIXED HYPERLIPIDEMIA: ICD-10-CM

## 2024-05-21 LAB — HBA1C MFR BLD: 10.3 %

## 2024-05-21 PROCEDURE — G2211 COMPLEX E/M VISIT ADD ON: HCPCS | Performed by: INTERNAL MEDICINE

## 2024-05-21 PROCEDURE — 1036F TOBACCO NON-USER: CPT | Performed by: INTERNAL MEDICINE

## 2024-05-21 PROCEDURE — 3017F COLORECTAL CA SCREEN DOC REV: CPT | Performed by: INTERNAL MEDICINE

## 2024-05-21 PROCEDURE — G8427 DOCREV CUR MEDS BY ELIG CLIN: HCPCS | Performed by: INTERNAL MEDICINE

## 2024-05-21 PROCEDURE — 83036 HEMOGLOBIN GLYCOSYLATED A1C: CPT | Performed by: INTERNAL MEDICINE

## 2024-05-21 PROCEDURE — 2022F DILAT RTA XM EVC RTNOPTHY: CPT | Performed by: INTERNAL MEDICINE

## 2024-05-21 PROCEDURE — 99214 OFFICE O/P EST MOD 30 MIN: CPT | Performed by: INTERNAL MEDICINE

## 2024-05-21 PROCEDURE — G8417 CALC BMI ABV UP PARAM F/U: HCPCS | Performed by: INTERNAL MEDICINE

## 2024-05-21 PROCEDURE — 1123F ACP DISCUSS/DSCN MKR DOCD: CPT | Performed by: INTERNAL MEDICINE

## 2024-05-21 PROCEDURE — 3046F HEMOGLOBIN A1C LEVEL >9.0%: CPT | Performed by: INTERNAL MEDICINE

## 2024-05-21 RX ORDER — EZETIMIBE 10 MG/1
10 TABLET ORAL DAILY
Qty: 90 TABLET | Refills: 3 | Status: SHIPPED | OUTPATIENT
Start: 2024-05-21

## 2024-05-21 RX ORDER — TIRZEPATIDE 10 MG/.5ML
10 INJECTION, SOLUTION SUBCUTANEOUS WEEKLY
Qty: 6 ML | Refills: 3 | Status: SHIPPED | OUTPATIENT
Start: 2024-05-21

## 2024-05-21 RX ORDER — TIRZEPATIDE 7.5 MG/.5ML
7.5 INJECTION, SOLUTION SUBCUTANEOUS WEEKLY
Qty: 6 ML | Refills: 3 | Status: SHIPPED | OUTPATIENT
Start: 2024-05-21

## 2024-05-21 RX ORDER — METFORMIN HYDROCHLORIDE 500 MG/1
1000 TABLET, EXTENDED RELEASE ORAL 2 TIMES DAILY WITH MEALS
Qty: 360 TABLET | Refills: 3 | Status: SHIPPED | OUTPATIENT
Start: 2024-05-21

## 2024-05-21 RX ORDER — GLIMEPIRIDE 1 MG/1
1 TABLET ORAL 2 TIMES DAILY WITH MEALS
Qty: 180 TABLET | Refills: 3 | Status: SHIPPED | OUTPATIENT
Start: 2024-05-21

## 2024-05-21 RX ORDER — LANCETS 30 GAUGE
EACH MISCELLANEOUS
Qty: 200 EACH | Refills: 11 | Status: SHIPPED | OUTPATIENT
Start: 2024-05-21

## 2024-05-21 RX ORDER — ICOSAPENT ETHYL 1 G/1
2 CAPSULE ORAL 2 TIMES DAILY
Qty: 360 CAPSULE | Refills: 3 | Status: SHIPPED | OUTPATIENT
Start: 2024-05-21

## 2024-05-21 RX ORDER — INSULIN GLARGINE 100 [IU]/ML
40 INJECTION, SOLUTION SUBCUTANEOUS NIGHTLY
Qty: 45 ML | Refills: 3 | Status: SHIPPED | OUTPATIENT
Start: 2024-05-21

## 2024-05-21 RX ORDER — BLOOD SUGAR DIAGNOSTIC
STRIP MISCELLANEOUS
Qty: 200 EACH | Refills: 11 | Status: SHIPPED | OUTPATIENT
Start: 2024-05-21

## 2024-05-21 RX ORDER — PEN NEEDLE, DIABETIC 32GX 5/32"
NEEDLE, DISPOSABLE MISCELLANEOUS
Qty: 100 EACH | Refills: 3 | Status: SHIPPED | OUTPATIENT
Start: 2024-05-21

## 2024-05-21 RX ORDER — PITAVASTATIN CALCIUM 2.09 MG/1
2 TABLET, FILM COATED ORAL NIGHTLY
Qty: 90 TABLET | Refills: 3
Start: 2024-05-21 | End: 2024-05-23 | Stop reason: SDUPTHER

## 2024-05-21 RX ORDER — FENOFIBRATE 145 MG/1
145 TABLET, COATED ORAL DAILY
Qty: 90 TABLET | Refills: 3 | Status: SHIPPED | OUTPATIENT
Start: 2024-05-21

## 2024-05-21 ASSESSMENT — ENCOUNTER SYMPTOMS
SHORTNESS OF BREATH: 0
DIARRHEA: 0
VOMITING: 0
NAUSEA: 0
CONSTIPATION: 0

## 2024-05-21 NOTE — PROGRESS NOTES
Angelito Marrero MD, FACE  [facility] End[facility]inology and [facility]  [address] [place] [address], [address]  [place] [address]  Phone [phone]  Facsimile [phone]          Jose Henderson is a 73 y.o. male seen 5/21/2024 for follow-up of type 2 diabetes mellitus           ASSESSMENT AND PLAN:    1. Uncontrolled type 2 diabetes mellitus with hyperglycemia, without long-term current use of insulin (HCC)  His glycemic control has deteriorated and his hemoglobin A1c is markedly above goal less than 7.  Unfortunately he has not been able to find Mounjaro and his glycemic control has suffered as a result.  He states that his pharmacy told him that they cannot obtain Toujeo and I will therefore switch him to Lantus.  I provided him with a prescription of 2 different doses of Mounjaro.  If he is unable to find Mounjaro, then I will switch him to Ozempic.  Eye exam negative 5/2023.  Urine microalbumin negative 8/2023.  Update labs prior to next visit.    - LANTUS SOLOSTAR 100 UNIT/ML injection pen; Inject 40 Units into the skin nightly  Dispense: 45 mL; Refill: 3  - MOUNJARO 7.5 MG/0.5ML SOPN SC injection; Inject 0.5 mLs into the skin once a week  Dispense: 6 mL; Refill: 3  - MOUNJARO 10 MG/0.5ML SOPN SC injection; Inject 0.5 mLs into the skin once a week  Dispense: 6 mL; Refill: 3  - metFORMIN (GLUCOPHAGE-XR) 500 MG extended release tablet; Take 2 tablets by mouth 2 times daily (with meals)  Dispense: 360 tablet; Refill: 3  - glimepiride (AMARYL) 1 MG tablet; Take 1 tablet by mouth 2 times daily (with meals)  Dispense: 180 tablet; Refill: 3  - BD PEN NEEDLE JESUS 2ND GEN 32G X 4 MM MISC; Once a day  Dispense: 100 each; Refill: 3  - ONETOUCH VERIO strip; 2 times a day  Dispense: 200 each; Refill: 11  - OneTouch Delica Lancets 30G MISC; 2 times a day  Dispense: 200 each; Refill: 11    2. Mixed hyperlipidemia  He has been intolerant of multiple statins as outlined below.  His insurance would not cover

## 2024-05-23 ENCOUNTER — OFFICE VISIT (OUTPATIENT)
Age: 74
End: 2024-05-23
Payer: MEDICARE

## 2024-05-23 VITALS
DIASTOLIC BLOOD PRESSURE: 80 MMHG | SYSTOLIC BLOOD PRESSURE: 138 MMHG | HEIGHT: 70 IN | BODY MASS INDEX: 35.36 KG/M2 | WEIGHT: 247 LBS | HEART RATE: 68 BPM

## 2024-05-23 DIAGNOSIS — E78.2 MIXED HYPERLIPIDEMIA: ICD-10-CM

## 2024-05-23 DIAGNOSIS — E78.5 DYSLIPIDEMIA: Primary | ICD-10-CM

## 2024-05-23 DIAGNOSIS — R06.09 DOE (DYSPNEA ON EXERTION): ICD-10-CM

## 2024-05-23 PROCEDURE — 99214 OFFICE O/P EST MOD 30 MIN: CPT | Performed by: INTERNAL MEDICINE

## 2024-05-23 PROCEDURE — 1123F ACP DISCUSS/DSCN MKR DOCD: CPT | Performed by: INTERNAL MEDICINE

## 2024-05-23 PROCEDURE — G8427 DOCREV CUR MEDS BY ELIG CLIN: HCPCS | Performed by: INTERNAL MEDICINE

## 2024-05-23 PROCEDURE — 3017F COLORECTAL CA SCREEN DOC REV: CPT | Performed by: INTERNAL MEDICINE

## 2024-05-23 PROCEDURE — G8417 CALC BMI ABV UP PARAM F/U: HCPCS | Performed by: INTERNAL MEDICINE

## 2024-05-23 PROCEDURE — 1036F TOBACCO NON-USER: CPT | Performed by: INTERNAL MEDICINE

## 2024-05-23 RX ORDER — PITAVASTATIN CALCIUM 2.09 MG/1
2 TABLET, FILM COATED ORAL NIGHTLY
Qty: 90 TABLET | Refills: 3 | Status: SHIPPED | OUTPATIENT
Start: 2024-05-23

## 2024-05-23 ASSESSMENT — ENCOUNTER SYMPTOMS
BLOATING: 0
BACK PAIN: 0
DOUBLE VISION: 0
VOMITING: 0
ABDOMINAL PAIN: 0
SHORTNESS OF BREATH: 0
COUGH: 0
BLURRED VISION: 0
HEMOPTYSIS: 0
NAUSEA: 0
ORTHOPNEA: 0

## 2024-05-23 NOTE — PROGRESS NOTES
dysuria.   Neurological:  Negative for dizziness, light-headedness and weakness.   Psychiatric/Behavioral:  Negative for altered mental status.        PHYSICAL EXAM:    /80   Pulse 68   Ht 1.778 m (5' 10\")   Wt 112 kg (247 lb)   BMI 35.44 kg/m²      Physical Exam  Constitutional:       Appearance: Normal appearance.   HENT:      Head: Normocephalic and atraumatic.      Mouth/Throat:      Mouth: Mucous membranes are moist.   Eyes:      Pupils: Pupils are equal, round, and reactive to light.   Cardiovascular:      Rate and Rhythm: Normal rate and regular rhythm.      Pulses: Normal pulses.   Pulmonary:      Effort: Pulmonary effort is normal.      Breath sounds: Normal breath sounds.   Abdominal:      General: Bowel sounds are normal. There is no distension.      Palpations: Abdomen is soft.      Tenderness: There is no abdominal tenderness.   Musculoskeletal:         General: No swelling.      Cervical back: Normal range of motion.   Skin:     General: Skin is warm and dry.   Neurological:      General: No focal deficit present.      Mental Status: He is alert and oriented to person, place, and time.         Medical problems and test results were reviewed with the patient today.     Recent Results (from the past 672 hour(s))   AMB POC HEMOGLOBIN A1C    Collection Time: 05/21/24  2:42 PM   Result Value Ref Range    Hemoglobin A1C, POC 10.3 %     Lab Results   Component Value Date/Time    CHOL 210 08/28/2023 08:47 AM    CHOL 264 02/14/2022 11:14 AM    HDL 45 08/28/2023 08:47 AM     08/28/2023 08:47 AM    VLDL 50 08/28/2023 08:47 AM    VLDL 48 02/14/2022 11:14 AM       No results found for any visits on 05/23/24.    ASSESSMENT and PLAN    Jose was seen today for follow-up.    Diagnoses and all orders for this visit:    Dyslipidemia  -     Lipid Panel; Future    MURPHY (dyspnea on exertion)    Mixed hyperlipidemia  -     pitavastatin (LIVALO) 2 MG TABS tablet; Take 1 tablet by mouth

## 2024-07-03 ENCOUNTER — TELEPHONE (OUTPATIENT)
Dept: INTERNAL MEDICINE CLINIC | Facility: CLINIC | Age: 74
End: 2024-07-03

## 2024-08-17 SDOH — ECONOMIC STABILITY: INCOME INSECURITY: HOW HARD IS IT FOR YOU TO PAY FOR THE VERY BASICS LIKE FOOD, HOUSING, MEDICAL CARE, AND HEATING?: SOMEWHAT HARD

## 2024-08-17 SDOH — ECONOMIC STABILITY: FOOD INSECURITY: WITHIN THE PAST 12 MONTHS, YOU WORRIED THAT YOUR FOOD WOULD RUN OUT BEFORE YOU GOT MONEY TO BUY MORE.: NEVER TRUE

## 2024-08-17 SDOH — ECONOMIC STABILITY: FOOD INSECURITY: WITHIN THE PAST 12 MONTHS, THE FOOD YOU BOUGHT JUST DIDN'T LAST AND YOU DIDN'T HAVE MONEY TO GET MORE.: NEVER TRUE

## 2024-08-17 ASSESSMENT — PATIENT HEALTH QUESTIONNAIRE - PHQ9
2. FEELING DOWN, DEPRESSED OR HOPELESS: NOT AT ALL
1. LITTLE INTEREST OR PLEASURE IN DOING THINGS: NOT AT ALL
2. FEELING DOWN, DEPRESSED OR HOPELESS: NOT AT ALL
SUM OF ALL RESPONSES TO PHQ QUESTIONS 1-9: 0
SUM OF ALL RESPONSES TO PHQ QUESTIONS 1-9: 0
SUM OF ALL RESPONSES TO PHQ9 QUESTIONS 1 & 2: 0
SUM OF ALL RESPONSES TO PHQ9 QUESTIONS 1 & 2: 0
SUM OF ALL RESPONSES TO PHQ QUESTIONS 1-9: 0
1. LITTLE INTEREST OR PLEASURE IN DOING THINGS: NOT AT ALL
SUM OF ALL RESPONSES TO PHQ QUESTIONS 1-9: 0

## 2024-08-19 ENCOUNTER — OFFICE VISIT (OUTPATIENT)
Dept: INTERNAL MEDICINE CLINIC | Facility: CLINIC | Age: 74
End: 2024-08-19
Payer: MEDICARE

## 2024-08-19 VITALS
HEIGHT: 70 IN | SYSTOLIC BLOOD PRESSURE: 135 MMHG | HEART RATE: 62 BPM | TEMPERATURE: 97.4 F | WEIGHT: 248 LBS | OXYGEN SATURATION: 97 % | BODY MASS INDEX: 35.5 KG/M2 | DIASTOLIC BLOOD PRESSURE: 73 MMHG

## 2024-08-19 DIAGNOSIS — R20.8 DECREASED PERIPHERAL VIBRATORY SENSE: ICD-10-CM

## 2024-08-19 DIAGNOSIS — E66.01 SEVERE OBESITY (BMI 35.0-39.9) WITH COMORBIDITY (HCC): ICD-10-CM

## 2024-08-19 DIAGNOSIS — M25.512 LEFT SHOULDER PAIN, UNSPECIFIED CHRONICITY: ICD-10-CM

## 2024-08-19 DIAGNOSIS — R35.1 NOCTURIA: ICD-10-CM

## 2024-08-19 DIAGNOSIS — E78.2 MIXED HYPERLIPIDEMIA: ICD-10-CM

## 2024-08-19 DIAGNOSIS — R27.0 ATAXIA: ICD-10-CM

## 2024-08-19 DIAGNOSIS — R27.0 ATAXIA: Primary | ICD-10-CM

## 2024-08-19 DIAGNOSIS — M54.12 CERVICAL RADICULOPATHY: ICD-10-CM

## 2024-08-19 DIAGNOSIS — E11.65 UNCONTROLLED TYPE 2 DIABETES MELLITUS WITH HYPERGLYCEMIA, WITHOUT LONG-TERM CURRENT USE OF INSULIN (HCC): ICD-10-CM

## 2024-08-19 DIAGNOSIS — I10 PRIMARY HYPERTENSION: ICD-10-CM

## 2024-08-19 LAB
ALBUMIN SERPL-MCNC: 4 G/DL (ref 3.2–4.6)
ALBUMIN/GLOB SERPL: 1.6 (ref 1–1.9)
ALP SERPL-CCNC: 48 U/L (ref 40–129)
ALT SERPL-CCNC: 30 U/L (ref 12–65)
ANION GAP SERPL CALC-SCNC: 11 MMOL/L (ref 9–18)
AST SERPL-CCNC: 24 U/L (ref 15–37)
BASOPHILS # BLD: 0.1 K/UL (ref 0–0.2)
BASOPHILS NFR BLD: 1 % (ref 0–2)
BILIRUB SERPL-MCNC: 0.4 MG/DL (ref 0–1.2)
BUN SERPL-MCNC: 20 MG/DL (ref 8–23)
CALCIUM SERPL-MCNC: 9.7 MG/DL (ref 8.8–10.2)
CHLORIDE SERPL-SCNC: 107 MMOL/L (ref 98–107)
CO2 SERPL-SCNC: 25 MMOL/L (ref 20–28)
CREAT SERPL-MCNC: 1.53 MG/DL (ref 0.8–1.3)
DIFFERENTIAL METHOD BLD: NORMAL
EOSINOPHIL # BLD: 0.2 K/UL (ref 0–0.8)
EOSINOPHIL NFR BLD: 3 % (ref 0.5–7.8)
ERYTHROCYTE [DISTWIDTH] IN BLOOD BY AUTOMATED COUNT: 13.2 % (ref 11.9–14.6)
GLOBULIN SER CALC-MCNC: 2.5 G/DL (ref 2.3–3.5)
GLUCOSE SERPL-MCNC: 117 MG/DL (ref 70–99)
HCT VFR BLD AUTO: 41.1 % (ref 41.1–50.3)
HGB BLD-MCNC: 13.8 G/DL (ref 13.6–17.2)
IMM GRANULOCYTES # BLD AUTO: 0 K/UL (ref 0–0.5)
IMM GRANULOCYTES NFR BLD AUTO: 1 % (ref 0–5)
LYMPHOCYTES # BLD: 1.5 K/UL (ref 0.5–4.6)
LYMPHOCYTES NFR BLD: 27 % (ref 13–44)
MCH RBC QN AUTO: 28.6 PG (ref 26.1–32.9)
MCHC RBC AUTO-ENTMCNC: 33.6 G/DL (ref 31.4–35)
MCV RBC AUTO: 85.3 FL (ref 82–102)
MONOCYTES # BLD: 0.5 K/UL (ref 0.1–1.3)
MONOCYTES NFR BLD: 9 % (ref 4–12)
NEUTS SEG # BLD: 3.3 K/UL (ref 1.7–8.2)
NEUTS SEG NFR BLD: 59 % (ref 43–78)
NRBC # BLD: 0 K/UL (ref 0–0.2)
PLATELET # BLD AUTO: 284 K/UL (ref 150–450)
PMV BLD AUTO: 10.9 FL (ref 9.4–12.3)
POTASSIUM SERPL-SCNC: 4.4 MMOL/L (ref 3.5–5.1)
PROT SERPL-MCNC: 6.5 G/DL (ref 6.3–8.2)
PSA SERPL-MCNC: 0.3 NG/ML (ref 0–4)
RBC # BLD AUTO: 4.82 M/UL (ref 4.23–5.6)
SODIUM SERPL-SCNC: 143 MMOL/L (ref 136–145)
VIT B12 SERPL-MCNC: 827 PG/ML (ref 193–986)
WBC # BLD AUTO: 5.5 K/UL (ref 4.3–11.1)

## 2024-08-19 PROCEDURE — G8417 CALC BMI ABV UP PARAM F/U: HCPCS | Performed by: INTERNAL MEDICINE

## 2024-08-19 PROCEDURE — 99214 OFFICE O/P EST MOD 30 MIN: CPT | Performed by: INTERNAL MEDICINE

## 2024-08-19 PROCEDURE — 3075F SYST BP GE 130 - 139MM HG: CPT | Performed by: INTERNAL MEDICINE

## 2024-08-19 PROCEDURE — 2022F DILAT RTA XM EVC RTNOPTHY: CPT | Performed by: INTERNAL MEDICINE

## 2024-08-19 PROCEDURE — 3078F DIAST BP <80 MM HG: CPT | Performed by: INTERNAL MEDICINE

## 2024-08-19 PROCEDURE — G8427 DOCREV CUR MEDS BY ELIG CLIN: HCPCS | Performed by: INTERNAL MEDICINE

## 2024-08-19 PROCEDURE — 3017F COLORECTAL CA SCREEN DOC REV: CPT | Performed by: INTERNAL MEDICINE

## 2024-08-19 PROCEDURE — 1123F ACP DISCUSS/DSCN MKR DOCD: CPT | Performed by: INTERNAL MEDICINE

## 2024-08-19 PROCEDURE — 3046F HEMOGLOBIN A1C LEVEL >9.0%: CPT | Performed by: INTERNAL MEDICINE

## 2024-08-19 PROCEDURE — 1036F TOBACCO NON-USER: CPT | Performed by: INTERNAL MEDICINE

## 2024-08-19 NOTE — PROGRESS NOTES
OR last GFR 15-59)  08/28/2024    Depression Screen  08/17/2025    DTaP/Tdap/Td vaccine (2 - Td or Tdap) 08/18/2028    Hepatitis A vaccine  Aged Out    Hepatitis B vaccine  Aged Out    Hib vaccine  Aged Out    Polio vaccine  Aged Out    Meningococcal (ACWY) vaccine  Aged Out             Review of Systems  Review of Systems   Constitutional:  Negative for fatigue and fever.   Respiratory:  Negative for cough and shortness of breath.    Cardiovascular:  Negative for chest pain.   Gastrointestinal:  Negative for abdominal pain.   Genitourinary:  Negative for difficulty urinating.   Musculoskeletal:  Positive for arthralgias, gait problem and myalgias.       /73 (Site: Left Upper Arm, Position: Sitting, Cuff Size: Medium Adult)   Pulse 62   Temp 97.4 °F (36.3 °C) (Temporal)   Ht 1.778 m (5' 10\")   Wt 112.5 kg (248 lb)   SpO2 97%   BMI 35.58 kg/m²     Wt Readings from Last 3 Encounters:   08/19/24 112.5 kg (248 lb)   05/23/24 112 kg (247 lb)   05/21/24 112 kg (247 lb)       Physical Exam    Physical Exam  Vitals and nursing note reviewed.   Constitutional:       Appearance: Normal appearance. He is obese.   HENT:      Head: Atraumatic.      Right Ear: Tympanic membrane normal.      Left Ear: Tympanic membrane normal.      Mouth/Throat:      Mouth: Mucous membranes are moist.   Eyes:      Extraocular Movements: Extraocular movements intact.      Conjunctiva/sclera: Conjunctivae normal.      Pupils: Pupils are equal, round, and reactive to light.   Neck:      Vascular: No carotid bruit.   Cardiovascular:      Rate and Rhythm: Normal rate and regular rhythm.      Pulses: Normal pulses.      Heart sounds: Normal heart sounds.   Pulmonary:      Effort: Pulmonary effort is normal.      Breath sounds: Normal breath sounds.   Abdominal:      General: Bowel sounds are normal.      Palpations: Abdomen is soft.      Tenderness: There is no abdominal tenderness.      Comments: Hernia repaired.     Musculoskeletal:       Left shoulder: Tenderness present. Normal range of motion (but pain with ROM).      Left hand: Swelling (contraction nodule at base of  thumb) present.   Feet:      Comments: Diabetic foot exam:   Left Foot:   Visual Exam: callous-    Pulse DP: 2+ (normal)   Filament test: reduced sensation   Vibratory Sensation: diminished  Right Foot:   Visual Exam: callous-      Pulse DP: 2+ (normal)   Filament test: reduced sensation   Vibratory Sensation: normal    Lymphadenopathy:      Cervical: No cervical adenopathy.   Skin:     General: Skin is warm and dry.   Neurological:      Mental Status: He is alert and oriented to person, place, and time.      Cranial Nerves: No cranial nerve deficit, dysarthria or facial asymmetry.      Sensory: Sensory deficit (decrease vibration in his feet.) present.      Motor: No weakness.      Coordination: Romberg sign positive. Finger-Nose-Finger Test and Heel to Shin Test normal. Rapid alternating movements normal.      Gait: Tandem walk abnormal. Gait normal.   Psychiatric:         Mood and Affect: Mood normal.             Assessment & Plan    There are no diagnoses linked to this encounter.       Encounter Diagnoses   Name Primary?    Ataxia Yes    Severe obesity (BMI 35.0-39.9) with comorbidity (HCC)     Decreased peripheral vibratory sense     Nocturia     Primary hypertension     Uncontrolled type 2 diabetes mellitus with hyperglycemia, without long-term current use of insulin (HCC)     Mixed hyperlipidemia     Cervical radiculopathy        No orders of the defined types were placed in this encounter.      Orders Placed This Encounter   Procedures    XR SHOULDER LEFT (MIN 2 VIEWS)     Standing Status:   Future     Standing Expiration Date:   8/19/2025     Order Specific Question:   Reason for exam:     Answer:   left numbness    XR CERVICAL SPINE (2-3 VIEWS)     Standing Status:   Future     Standing Expiration Date:   8/19/2025     Order Specific Question:   Reason for exam:

## 2024-08-20 NOTE — RESULT ENCOUNTER NOTE
There has been some decline in your kidney fzn. Make sure you Avoid regular use of antiinflammatory medications. Some over the counter antiinflammatories are Advil, Aleve, Ibuprofen, Motrin, Naproxen and Naprosyn. These medication can potentially worsen your kidney function. Instead try Tylenol or Acetaminophen. Repeat BMP in 3 months.   PSA was 0.3

## 2024-08-21 ENCOUNTER — TELEPHONE (OUTPATIENT)
Dept: INTERNAL MEDICINE CLINIC | Facility: CLINIC | Age: 74
End: 2024-08-21

## 2024-08-21 NOTE — TELEPHONE ENCOUNTER
----- Message from Dr. Shilpi Bajwa MD sent at 8/20/2024  5:42 PM EDT -----  There has been some decline in your kidney fzn. Make sure you Avoid regular use of antiinflammatory medications. Some over the counter antiinflammatories are Advil, Aleve, Ibuprofen, Motrin, Naproxen and Naprosyn. These medication can potentially worsen your kidney function. Instead try Tylenol or Acetaminophen. Repeat BMP in 3 months.   PSA was 0.3

## 2024-08-26 ASSESSMENT — ENCOUNTER SYMPTOMS
ABDOMINAL PAIN: 0
SHORTNESS OF BREATH: 0
COUGH: 0

## 2024-08-29 ENCOUNTER — HOSPITAL ENCOUNTER (OUTPATIENT)
Dept: MRI IMAGING | Age: 74
Discharge: HOME | End: 2024-08-29
Attending: INTERNAL MEDICINE
Payer: MEDICARE

## 2024-08-29 DIAGNOSIS — R27.0 ATAXIA: ICD-10-CM

## 2024-08-29 PROCEDURE — 70551 MRI BRAIN STEM W/O DYE: CPT

## 2024-08-30 NOTE — RESULT ENCOUNTER NOTE
MRI of brain un remarkable.  Mild brain shrinkage consistent with age.   Consider further evaluation of neuropathy in his feet causing this unsteady gait.   Check lumbar MRI or nerve conduction studies in his legs next. Also consider physical therapy for gait evaluation and treatment.   Shilpi Bajwa MD

## 2024-09-03 ENCOUNTER — TELEPHONE (OUTPATIENT)
Dept: INTERNAL MEDICINE CLINIC | Facility: CLINIC | Age: 74
End: 2024-09-03

## 2024-09-03 NOTE — TELEPHONE ENCOUNTER
----- Message from Dr. Shilpi Bajwa MD sent at 8/30/2024  5:38 PM EDT -----  MRI of brain un remarkable.  Mild brain shrinkage consistent with age.   Consider further evaluation of neuropathy in his feet causing this unsteady gait.   Check lumbar MRI or nerve conduction studies in his legs next. Also consider physical therapy for gait evaluation and treatment.   Shilpi Bajwa MD

## 2024-09-04 ENCOUNTER — HOSPITAL ENCOUNTER (OUTPATIENT)
Dept: GENERAL RADIOLOGY | Age: 74
Discharge: HOME | End: 2024-09-07
Payer: MEDICARE

## 2024-09-04 DIAGNOSIS — M25.512 LEFT SHOULDER PAIN, UNSPECIFIED CHRONICITY: ICD-10-CM

## 2024-09-04 DIAGNOSIS — M54.12 CERVICAL RADICULOPATHY: ICD-10-CM

## 2024-09-04 DIAGNOSIS — R27.0 ATAXIA: ICD-10-CM

## 2024-09-04 PROCEDURE — 73030 X-RAY EXAM OF SHOULDER: CPT

## 2024-09-04 PROCEDURE — 72040 X-RAY EXAM NECK SPINE 2-3 VW: CPT

## 2024-09-04 NOTE — TELEPHONE ENCOUNTER
Pt given results and relayed understanding. Pt says his dizziness is the biggest part of his gait issues.

## 2024-09-06 ENCOUNTER — TELEPHONE (OUTPATIENT)
Dept: INTERNAL MEDICINE CLINIC | Facility: CLINIC | Age: 74
End: 2024-09-06

## 2024-09-06 NOTE — TELEPHONE ENCOUNTER
----- Message from Dr. 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  Consider ortho referral for further evaluation.   He had arthritic changes in the bones of his neck.  The disc are also drying up. This could contribute to pinched nerves in his neck. Consider MRI

## 2024-09-06 NOTE — RESULT ENCOUNTER NOTE
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  Consider ortho referral for further evaluation.   He had arthritic changes in the bones of his neck.  The disc are also drying up. This could contribute to pinched nerves in his neck. Consider MRI

## 2024-09-27 LAB
ALBUMIN SERPL-MCNC: 4.7 G/DL (ref 3.8–4.8)
ALBUMIN/CREAT UR: 22 MG/G CREAT (ref 0–29)
ALP SERPL-CCNC: 47 IU/L (ref 44–121)
ALT SERPL-CCNC: 28 IU/L (ref 0–44)
AST SERPL-CCNC: 21 IU/L (ref 0–40)
BILIRUB SERPL-MCNC: 0.4 MG/DL (ref 0–1.2)
BUN SERPL-MCNC: 21 MG/DL (ref 8–27)
BUN/CREAT SERPL: 14 (ref 10–24)
CALCIUM SERPL-MCNC: 9.5 MG/DL (ref 8.6–10.2)
CHLORIDE SERPL-SCNC: 107 MMOL/L (ref 96–106)
CHOLEST SERPL-MCNC: 163 MG/DL (ref 100–199)
CO2 SERPL-SCNC: 24 MMOL/L (ref 20–29)
CREAT SERPL-MCNC: 1.45 MG/DL (ref 0.76–1.27)
CREAT UR-MCNC: 109.2 MG/DL
EGFRCR SERPLBLD CKD-EPI 2021: 51 ML/MIN/1.73
GLOBULIN SER CALC-MCNC: 2 G/DL (ref 1.5–4.5)
GLUCOSE SERPL-MCNC: 99 MG/DL (ref 70–99)
HBA1C MFR BLD: 6.7 % (ref 4.8–5.6)
HDLC SERPL-MCNC: 46 MG/DL
LDLC SERPL CALC-MCNC: 87 MG/DL (ref 0–99)
MICROALBUMIN UR-MCNC: 23.5 UG/ML
POTASSIUM SERPL-SCNC: 4.3 MMOL/L (ref 3.5–5.2)
PROT SERPL-MCNC: 6.7 G/DL (ref 6–8.5)
SODIUM SERPL-SCNC: 144 MMOL/L (ref 134–144)
SPECIMEN STATUS REPORT: NORMAL
T4 FREE SERPL-MCNC: 0.96 NG/DL (ref 0.82–1.77)
TRIGL SERPL-MCNC: 178 MG/DL (ref 0–149)
TSH SERPL DL<=0.005 MIU/L-ACNC: 2.69 UIU/ML (ref 0.45–4.5)
VLDLC SERPL CALC-MCNC: 30 MG/DL (ref 5–40)

## 2024-09-30 ENCOUNTER — OFFICE VISIT (OUTPATIENT)
Dept: ENDOCRINOLOGY | Age: 74
End: 2024-09-30
Payer: MEDICARE

## 2024-09-30 VITALS
DIASTOLIC BLOOD PRESSURE: 76 MMHG | RESPIRATION RATE: 18 BRPM | OXYGEN SATURATION: 98 % | HEART RATE: 64 BPM | BODY MASS INDEX: 34.5 KG/M2 | HEIGHT: 70 IN | WEIGHT: 241 LBS | SYSTOLIC BLOOD PRESSURE: 130 MMHG

## 2024-09-30 DIAGNOSIS — E78.2 MIXED HYPERLIPIDEMIA: ICD-10-CM

## 2024-09-30 DIAGNOSIS — Z79.4 CONTROLLED TYPE 2 DIABETES MELLITUS WITH HYPERGLYCEMIA, WITH LONG-TERM CURRENT USE OF INSULIN (HCC): Primary | ICD-10-CM

## 2024-09-30 DIAGNOSIS — E11.65 CONTROLLED TYPE 2 DIABETES MELLITUS WITH HYPERGLYCEMIA, WITH LONG-TERM CURRENT USE OF INSULIN (HCC): Primary | ICD-10-CM

## 2024-09-30 PROCEDURE — 99214 OFFICE O/P EST MOD 30 MIN: CPT | Performed by: INTERNAL MEDICINE

## 2024-09-30 PROCEDURE — G2211 COMPLEX E/M VISIT ADD ON: HCPCS | Performed by: INTERNAL MEDICINE

## 2024-09-30 PROCEDURE — 1036F TOBACCO NON-USER: CPT | Performed by: INTERNAL MEDICINE

## 2024-09-30 PROCEDURE — 1123F ACP DISCUSS/DSCN MKR DOCD: CPT | Performed by: INTERNAL MEDICINE

## 2024-09-30 PROCEDURE — G8417 CALC BMI ABV UP PARAM F/U: HCPCS | Performed by: INTERNAL MEDICINE

## 2024-09-30 PROCEDURE — 3044F HG A1C LEVEL LT 7.0%: CPT | Performed by: INTERNAL MEDICINE

## 2024-09-30 PROCEDURE — 3017F COLORECTAL CA SCREEN DOC REV: CPT | Performed by: INTERNAL MEDICINE

## 2024-09-30 PROCEDURE — 2022F DILAT RTA XM EVC RTNOPTHY: CPT | Performed by: INTERNAL MEDICINE

## 2024-09-30 PROCEDURE — G8427 DOCREV CUR MEDS BY ELIG CLIN: HCPCS | Performed by: INTERNAL MEDICINE

## 2024-09-30 RX ORDER — ICOSAPENT ETHYL 1 G/1
2 CAPSULE ORAL 2 TIMES DAILY
Qty: 360 CAPSULE | Refills: 3 | Status: SHIPPED | OUTPATIENT
Start: 2024-09-30

## 2024-09-30 RX ORDER — TIRZEPATIDE 10 MG/.5ML
10 INJECTION, SOLUTION SUBCUTANEOUS WEEKLY
Qty: 6 ML | Refills: 3 | Status: SHIPPED | OUTPATIENT
Start: 2024-09-30

## 2024-09-30 RX ORDER — BLOOD SUGAR DIAGNOSTIC
STRIP MISCELLANEOUS
Qty: 200 EACH | Refills: 11 | Status: SHIPPED | OUTPATIENT
Start: 2024-09-30

## 2024-09-30 RX ORDER — PEN NEEDLE, DIABETIC 32GX 5/32"
NEEDLE, DISPOSABLE MISCELLANEOUS
Qty: 100 EACH | Refills: 3 | Status: SHIPPED | OUTPATIENT
Start: 2024-09-30

## 2024-09-30 RX ORDER — LANCETS 30 GAUGE
EACH MISCELLANEOUS
Qty: 200 EACH | Refills: 11 | Status: SHIPPED | OUTPATIENT
Start: 2024-09-30

## 2024-09-30 RX ORDER — TIRZEPATIDE 7.5 MG/.5ML
7.5 INJECTION, SOLUTION SUBCUTANEOUS WEEKLY
Qty: 6 ML | Refills: 3 | Status: SHIPPED | OUTPATIENT
Start: 2024-09-30 | End: 2024-09-30 | Stop reason: DRUGHIGH

## 2024-09-30 RX ORDER — FENOFIBRATE 145 MG/1
145 TABLET, COATED ORAL DAILY
Qty: 90 TABLET | Refills: 3 | Status: SHIPPED | OUTPATIENT
Start: 2024-09-30

## 2024-09-30 RX ORDER — GLIMEPIRIDE 1 MG/1
1 TABLET ORAL
Qty: 90 TABLET | Refills: 3 | Status: SHIPPED | OUTPATIENT
Start: 2024-09-30

## 2024-09-30 RX ORDER — METFORMIN HCL 500 MG
1000 TABLET, EXTENDED RELEASE 24 HR ORAL 2 TIMES DAILY WITH MEALS
Qty: 360 TABLET | Refills: 3 | Status: SHIPPED | OUTPATIENT
Start: 2024-09-30

## 2024-09-30 RX ORDER — INSULIN GLARGINE 100 [IU]/ML
40 INJECTION, SOLUTION SUBCUTANEOUS NIGHTLY
Qty: 45 ML | Refills: 3 | Status: SHIPPED | OUTPATIENT
Start: 2024-09-30

## 2024-09-30 RX ORDER — PITAVASTATIN CALCIUM 2.09 MG/1
2 TABLET, FILM COATED ORAL NIGHTLY
Qty: 90 TABLET | Refills: 3 | Status: SHIPPED | OUTPATIENT
Start: 2024-09-30

## 2024-09-30 RX ORDER — EZETIMIBE 10 MG/1
10 TABLET ORAL DAILY
Qty: 90 TABLET | Refills: 3 | Status: SHIPPED | OUTPATIENT
Start: 2024-09-30

## 2024-09-30 ASSESSMENT — ENCOUNTER SYMPTOMS
VOMITING: 0
CONSTIPATION: 0
SHORTNESS OF BREATH: 0
NAUSEA: 0
DIARRHEA: 0

## 2024-09-30 NOTE — PROGRESS NOTES
Angelito Marrero MD, FACE  [facility]  [address] [place] [address], [address]  [place] [address]  Phone [phone]  Facsimile [phone]          Jose Henderson is a 74 y.o. male seen 9/30/2024 for follow-up of type 2 diabetes mellitus           ASSESSMENT AND PLAN:    1. Controlled type 2 diabetes mellitus with hyperglycemia, with long-term current use of insulin (HCC)  His glycemic control has improved and his hemoglobin A1c is at goal less than 7.  I will try decreasing his glimepiride as below since he will be increasing his Mounjaro to 10 mg weekly soon.  Eye exam negative 5/2023 and I instructed him to make an appointment.  Urine microalbumin negative 9/2024.      - LANTUS SOLOSTAR 100 UNIT/ML injection pen; Inject 40 Units into the skin nightly  Dispense: 45 mL; Refill: 3  - MOUNJARO 10 MG/0.5ML SOPN SC injection; Inject 0.5 mLs into the skin once a week  Dispense: 6 mL; Refill: 3  - metFORMIN (GLUCOPHAGE-XR) 500 MG extended release tablet; Take 2 tablets by mouth 2 times daily (with meals)  Dispense: 360 tablet; Refill: 3  - glimepiride (AMARYL) 1 MG tablet; Take 1 tablet by mouth every morning (before breakfast)  Dispense: 90 tablet; Refill: 3  - BD PEN NEEDLE JESUS 2ND GEN 32G X 4 MM MISC; Once a day  Dispense: 100 each; Refill: 3  - ONETOUCH VERIO strip; 2 times a day  Dispense: 200 each; Refill: 11  - OneTouch Delica Lancets 30G MISC; 2 times a day  Dispense: 200 each; Refill: 11    2. Mixed hyperlipidemia  He has been intolerant of multiple statins as outlined below.  His insurance would not cover Nexletol, Nexlizet, or Repatha.  His LDL has not been at goal.  His cardiologist started him on pitavastatin and he is tolerating it.  His triglycerides remain elevated despite fenofibrate and fish oil.    - pitavastatin (LIVALO) 2 MG TABS tablet; Take 1 tablet by mouth nightly  Dispense: 90 tablet; Refill: 3  - ezetimibe (ZETIA) 10 MG tablet; Take 1 tablet by mouth

## 2024-11-19 DIAGNOSIS — Z79.4 CONTROLLED TYPE 2 DIABETES MELLITUS WITH HYPERGLYCEMIA, WITH LONG-TERM CURRENT USE OF INSULIN (HCC): ICD-10-CM

## 2024-11-19 DIAGNOSIS — E11.65 CONTROLLED TYPE 2 DIABETES MELLITUS WITH HYPERGLYCEMIA, WITH LONG-TERM CURRENT USE OF INSULIN (HCC): ICD-10-CM

## 2024-11-21 RX ORDER — BLOOD SUGAR DIAGNOSTIC
STRIP MISCELLANEOUS
Qty: 100 STRIP | Refills: 11 | OUTPATIENT
Start: 2024-11-21

## 2025-02-18 SDOH — ECONOMIC STABILITY: FOOD INSECURITY: WITHIN THE PAST 12 MONTHS, THE FOOD YOU BOUGHT JUST DIDN'T LAST AND YOU DIDN'T HAVE MONEY TO GET MORE.: NEVER TRUE

## 2025-02-18 SDOH — ECONOMIC STABILITY: FOOD INSECURITY: WITHIN THE PAST 12 MONTHS, YOU WORRIED THAT YOUR FOOD WOULD RUN OUT BEFORE YOU GOT MONEY TO BUY MORE.: NEVER TRUE

## 2025-02-18 SDOH — ECONOMIC STABILITY: TRANSPORTATION INSECURITY
IN THE PAST 12 MONTHS, HAS THE LACK OF TRANSPORTATION KEPT YOU FROM MEDICAL APPOINTMENTS OR FROM GETTING MEDICATIONS?: NO

## 2025-02-18 SDOH — HEALTH STABILITY: PHYSICAL HEALTH: ON AVERAGE, HOW MANY DAYS PER WEEK DO YOU ENGAGE IN MODERATE TO STRENUOUS EXERCISE (LIKE A BRISK WALK)?: 3 DAYS

## 2025-02-18 SDOH — ECONOMIC STABILITY: INCOME INSECURITY: IN THE LAST 12 MONTHS, WAS THERE A TIME WHEN YOU WERE NOT ABLE TO PAY THE MORTGAGE OR RENT ON TIME?: NO

## 2025-02-18 SDOH — HEALTH STABILITY: PHYSICAL HEALTH: ON AVERAGE, HOW MANY MINUTES DO YOU ENGAGE IN EXERCISE AT THIS LEVEL?: 30 MIN

## 2025-02-18 ASSESSMENT — LIFESTYLE VARIABLES
HOW OFTEN DO YOU HAVE A DRINK CONTAINING ALCOHOL: NEVER
HOW OFTEN DO YOU HAVE SIX OR MORE DRINKS ON ONE OCCASION: 1
HOW OFTEN DO YOU HAVE A DRINK CONTAINING ALCOHOL: 1
HOW MANY STANDARD DRINKS CONTAINING ALCOHOL DO YOU HAVE ON A TYPICAL DAY: 0
HOW MANY STANDARD DRINKS CONTAINING ALCOHOL DO YOU HAVE ON A TYPICAL DAY: PATIENT DOES NOT DRINK

## 2025-02-18 ASSESSMENT — PATIENT HEALTH QUESTIONNAIRE - PHQ9
SUM OF ALL RESPONSES TO PHQ QUESTIONS 1-9: 0
2. FEELING DOWN, DEPRESSED OR HOPELESS: NOT AT ALL
SUM OF ALL RESPONSES TO PHQ9 QUESTIONS 1 & 2: 0
SUM OF ALL RESPONSES TO PHQ QUESTIONS 1-9: 0
1. LITTLE INTEREST OR PLEASURE IN DOING THINGS: NOT AT ALL

## 2025-02-19 DIAGNOSIS — E11.65 CONTROLLED TYPE 2 DIABETES MELLITUS WITH HYPERGLYCEMIA, WITH LONG-TERM CURRENT USE OF INSULIN (HCC): ICD-10-CM

## 2025-02-19 DIAGNOSIS — Z79.4 CONTROLLED TYPE 2 DIABETES MELLITUS WITH HYPERGLYCEMIA, WITH LONG-TERM CURRENT USE OF INSULIN (HCC): ICD-10-CM

## 2025-02-21 ENCOUNTER — OFFICE VISIT (OUTPATIENT)
Dept: INTERNAL MEDICINE CLINIC | Facility: CLINIC | Age: 75
End: 2025-02-21

## 2025-02-21 VITALS
HEART RATE: 80 BPM | WEIGHT: 241 LBS | DIASTOLIC BLOOD PRESSURE: 72 MMHG | SYSTOLIC BLOOD PRESSURE: 137 MMHG | OXYGEN SATURATION: 96 % | BODY MASS INDEX: 34.5 KG/M2 | HEIGHT: 70 IN | TEMPERATURE: 97.6 F

## 2025-02-21 DIAGNOSIS — R20.0 NUMBNESS OF LEFT HAND: ICD-10-CM

## 2025-02-21 DIAGNOSIS — Z79.4 CONTROLLED TYPE 2 DIABETES MELLITUS WITH HYPERGLYCEMIA, WITH LONG-TERM CURRENT USE OF INSULIN (HCC): ICD-10-CM

## 2025-02-21 DIAGNOSIS — R22.32 SUBCUTANEOUS NODULE OF LEFT THUMB: ICD-10-CM

## 2025-02-21 DIAGNOSIS — N52.9 ERECTILE DYSFUNCTION, UNSPECIFIED ERECTILE DYSFUNCTION TYPE: ICD-10-CM

## 2025-02-21 DIAGNOSIS — E78.2 MIXED HYPERLIPIDEMIA: ICD-10-CM

## 2025-02-21 DIAGNOSIS — E11.65 CONTROLLED TYPE 2 DIABETES MELLITUS WITH HYPERGLYCEMIA, WITH LONG-TERM CURRENT USE OF INSULIN (HCC): ICD-10-CM

## 2025-02-21 DIAGNOSIS — R39.15 URGENCY OF MICTURITION: ICD-10-CM

## 2025-02-21 DIAGNOSIS — E66.01 SEVERE OBESITY (BMI 35.0-39.9) WITH COMORBIDITY: ICD-10-CM

## 2025-02-21 DIAGNOSIS — Z00.00 MEDICARE ANNUAL WELLNESS VISIT, SUBSEQUENT: Primary | ICD-10-CM

## 2025-02-21 DIAGNOSIS — G47.33 OBSTRUCTIVE SLEEP APNEA SYNDROME: ICD-10-CM

## 2025-02-21 LAB
ALBUMIN SERPL-MCNC: 4.2 G/DL (ref 3.2–4.6)
ALBUMIN/GLOB SERPL: 1.7 (ref 1–1.9)
ALP SERPL-CCNC: 52 U/L (ref 40–129)
ALT SERPL-CCNC: 32 U/L (ref 8–55)
ANION GAP SERPL CALC-SCNC: 11 MMOL/L (ref 7–16)
AST SERPL-CCNC: 22 U/L (ref 15–37)
BACTERIA URNS QL MICRO: 0 /HPF
BASOPHILS # BLD: 0.07 K/UL (ref 0–0.2)
BASOPHILS NFR BLD: 1.2 % (ref 0–2)
BILIRUB SERPL-MCNC: 0.4 MG/DL (ref 0–1.2)
BUN SERPL-MCNC: 24 MG/DL (ref 8–23)
CALCIUM SERPL-MCNC: 9.6 MG/DL (ref 8.8–10.2)
CASTS URNS QL MICRO: 0 /LPF
CHLORIDE SERPL-SCNC: 108 MMOL/L (ref 98–107)
CHOLEST SERPL-MCNC: 164 MG/DL (ref 0–200)
CO2 SERPL-SCNC: 25 MMOL/L (ref 20–29)
CREAT SERPL-MCNC: 1.49 MG/DL (ref 0.8–1.3)
CRYSTALS URNS QL MICRO: 0 /LPF
DIFFERENTIAL METHOD BLD: ABNORMAL
EOSINOPHIL # BLD: 0.17 K/UL (ref 0–0.8)
EOSINOPHIL NFR BLD: 2.9 % (ref 0.5–7.8)
EPI CELLS #/AREA URNS HPF: NORMAL /HPF
ERYTHROCYTE [DISTWIDTH] IN BLOOD BY AUTOMATED COUNT: 13.1 % (ref 11.9–14.6)
EST. AVERAGE GLUCOSE BLD GHB EST-MCNC: 158 MG/DL
GLOBULIN SER CALC-MCNC: 2.5 G/DL (ref 2.3–3.5)
GLUCOSE SERPL-MCNC: 188 MG/DL (ref 70–99)
HBA1C MFR BLD: 7.1 % (ref 0–5.6)
HCT VFR BLD AUTO: 40.9 % (ref 41.1–50.3)
HDLC SERPL-MCNC: 44 MG/DL (ref 40–60)
HDLC SERPL: 3.7 (ref 0–5)
HGB BLD-MCNC: 13.8 G/DL (ref 13.6–17.2)
IMM GRANULOCYTES # BLD AUTO: 0.03 K/UL (ref 0–0.5)
IMM GRANULOCYTES NFR BLD AUTO: 0.5 % (ref 0–5)
LDLC SERPL CALC-MCNC: 75 MG/DL (ref 0–100)
LYMPHOCYTES # BLD: 1.35 K/UL (ref 0.5–4.6)
LYMPHOCYTES NFR BLD: 22.8 % (ref 13–44)
MCH RBC QN AUTO: 28.3 PG (ref 26.1–32.9)
MCHC RBC AUTO-ENTMCNC: 33.7 G/DL (ref 31.4–35)
MCV RBC AUTO: 83.8 FL (ref 82–102)
MONOCYTES # BLD: 0.54 K/UL (ref 0.1–1.3)
MONOCYTES NFR BLD: 9.1 % (ref 4–12)
MUCOUS THREADS URNS QL MICRO: 0 /LPF
NEUTS SEG # BLD: 3.77 K/UL (ref 1.7–8.2)
NEUTS SEG NFR BLD: 63.5 % (ref 43–78)
NRBC # BLD: 0 K/UL (ref 0–0.2)
OTHER OBSERVATIONS: NORMAL
PLATELET # BLD AUTO: 297 K/UL (ref 150–450)
PMV BLD AUTO: 10.9 FL (ref 9.4–12.3)
POTASSIUM SERPL-SCNC: 4.5 MMOL/L (ref 3.5–5.1)
PROT SERPL-MCNC: 6.6 G/DL (ref 6.3–8.2)
PSA SERPL-MCNC: 0.3 NG/ML (ref 0–4)
RBC # BLD AUTO: 4.88 M/UL (ref 4.23–5.6)
RBC #/AREA URNS HPF: NORMAL /HPF
SODIUM SERPL-SCNC: 143 MMOL/L (ref 136–145)
TRIGL SERPL-MCNC: 224 MG/DL (ref 0–150)
VIT B12 SERPL-MCNC: 491 PG/ML (ref 193–986)
VLDLC SERPL CALC-MCNC: 45 MG/DL (ref 6–23)
WBC # BLD AUTO: 5.9 K/UL (ref 4.3–11.1)
WBC URNS QL MICRO: NORMAL /HPF

## 2025-02-21 SDOH — ECONOMIC STABILITY: FOOD INSECURITY: WITHIN THE PAST 12 MONTHS, YOU WORRIED THAT YOUR FOOD WOULD RUN OUT BEFORE YOU GOT MONEY TO BUY MORE.: NEVER TRUE

## 2025-02-21 SDOH — ECONOMIC STABILITY: FOOD INSECURITY: WITHIN THE PAST 12 MONTHS, THE FOOD YOU BOUGHT JUST DIDN'T LAST AND YOU DIDN'T HAVE MONEY TO GET MORE.: NEVER TRUE

## 2025-02-21 NOTE — PATIENT INSTRUCTIONS
[email or web address] and enter F075 to learn more about \"A Healthy Heart: Care Instructions.\"  Current as of: July 31, 2024  Content Version: 14.3  © 2024 [email or web address].   Care instructions adapted under license by "[employer]. If you have questions about a medical condition or this instruction, always ask your healthcare professional. [employer] disclaims any warranty or liability for your use of this information.    Personalized Preventive Plan for Jose Henderson - 2/21/2025  Medicare offers a range of preventive health benefits. Some of the tests and screenings are paid in full while other may be subject to a deductible, co-insurance, and/or copay.  Some of these benefits include a comprehensive review of your medical history including lifestyle, illnesses that may run in your family, and various assessments and screenings as appropriate.  After reviewing your medical record and screening and assessments performed today your provider may have ordered immunizations, labs, imaging, and/or referrals for you.  A list of these orders (if applicable) as well as your Preventive Care list are included within your After Visit Summary for your review.

## 2025-02-21 NOTE — PROGRESS NOTES
02/21/2025   Location:[facility] PHYSICIAN SERVICES  [facility] INTERNAL MEDICINE  [place]  Patient #:  [id number]  YOB: 1950        History of Present Illness     Chief Complaint   Patient presents with    Medicare AWV     Subsequent     6 Month Follow-Up     hyperlipidemia       Mr. Henderson is a 74 y.o. male  who presents for This is a combined medical follow up office visit and a Medicare Wellness Visit.  The Wellness note has been reviewed.     Chronic active medical issues DM, HLP, SREE on CPAP. .  Intolerant of statin due to myalgia.  Intolerant of Farxiga  Painin groin when he walks. Occasionally with hills.   Hand goes numb if his arm propped for long discfutan.   Iss with numbness at rest.   Head spins when he lays down at night. Spinning . No nausea\  Sense of urgency since he had proceudre for kidney stone.   Penis is retracted  No blood. 'rLQ pain if he stands for longe period of time.  Not tender. No relieved wit massage. Relieved by sitting down.   Last Labs  CBC:   Lab Results   Component Value Date/Time    WBC 5.9 02/21/2025 12:26 PM    RBC 4.88 02/21/2025 12:26 PM    HGB 13.8 02/21/2025 12:26 PM    HCT 40.9 02/21/2025 12:26 PM    MCV 83.8 02/21/2025 12:26 PM    MCH 28.3 02/21/2025 12:26 PM    MCHC 33.7 02/21/2025 12:26 PM    RDW 13.1 02/21/2025 12:26 PM     02/21/2025 12:26 PM    MPV 10.9 02/21/2025 12:26 PM     CMP:    Lab Results   Component Value Date/Time     02/21/2025 12:26 PM    K 4.5 02/21/2025 12:26 PM     02/21/2025 12:26 PM    CO2 25 02/21/2025 12:26 PM    BUN 24 02/21/2025 12:26 PM    CREATININE 1.49 02/21/2025 12:26 PM    GFRAA 63 02/14/2022 11:14 AM    AGRATIO 2.0 08/28/2023 08:47 AM    LABGLOM 50 02/21/2025 12:26 PM    LABGLOM 57 08/28/2023 08:47 AM    GLUCOSE 188 02/21/2025 12:26 PM    GLUCOSE 99 09/26/2024 10:07 AM    LABALBU 23.5 09/26/2024 10:07 AM    CALCIUM 9.6 02/21/2025 12:26 PM    BILITOT 0.4 02/21/2025 12:26 PM    ALKPHOS 52 02/21/2025

## 2025-02-24 LAB
BACTERIA SPEC CULT: NORMAL
SERVICE CMNT-IMP: NORMAL

## 2025-02-24 RX ORDER — BLOOD SUGAR DIAGNOSTIC
STRIP MISCELLANEOUS
Qty: 200 STRIP | Refills: 11 | OUTPATIENT
Start: 2025-02-24

## 2025-02-28 NOTE — RESULT ENCOUNTER NOTE
Stable  chronic kidney disease  TG are elevated.  Diabetes control a little worse.   Avoid sweet/sugary foods and beverages. Limit carbohydrates  in your diet. Carbohydrates like bread, pasta, rice and white potatoes are broken down by the body into glucose which is sugar. Sugar is a source of energy. So the more active you are the more sugar is burned off. Aim for 150 minutes of aerobic exercise over the course of a week. Walking is great exercise.

## 2025-03-06 ENCOUNTER — OFFICE VISIT (OUTPATIENT)
Age: 75
End: 2025-03-06

## 2025-03-06 DIAGNOSIS — M72.0 DUPUYTREN'S DISEASE: ICD-10-CM

## 2025-03-06 DIAGNOSIS — G56.02 LEFT CARPAL TUNNEL SYNDROME: ICD-10-CM

## 2025-03-06 DIAGNOSIS — M79.642 LEFT HAND PAIN: Primary | ICD-10-CM

## 2025-03-06 DIAGNOSIS — M18.12 ARTHRITIS OF CARPOMETACARPAL (CMC) JOINT OF LEFT THUMB: ICD-10-CM

## 2025-03-11 ENCOUNTER — OFFICE VISIT (OUTPATIENT)
Dept: ENDOCRINOLOGY | Age: 75
End: 2025-03-11
Payer: MEDICARE

## 2025-03-11 VITALS
SYSTOLIC BLOOD PRESSURE: 178 MMHG | HEART RATE: 76 BPM | OXYGEN SATURATION: 97 % | WEIGHT: 241 LBS | BODY MASS INDEX: 34.5 KG/M2 | DIASTOLIC BLOOD PRESSURE: 86 MMHG | HEIGHT: 70 IN | RESPIRATION RATE: 18 BRPM

## 2025-03-11 DIAGNOSIS — E11.65 UNCONTROLLED TYPE 2 DIABETES MELLITUS WITH HYPERGLYCEMIA, WITH LONG-TERM CURRENT USE OF INSULIN (HCC): Primary | ICD-10-CM

## 2025-03-11 DIAGNOSIS — Z79.4 UNCONTROLLED TYPE 2 DIABETES MELLITUS WITH HYPERGLYCEMIA, WITH LONG-TERM CURRENT USE OF INSULIN (HCC): Primary | ICD-10-CM

## 2025-03-11 DIAGNOSIS — E78.2 MIXED HYPERLIPIDEMIA: ICD-10-CM

## 2025-03-11 PROCEDURE — G2211 COMPLEX E/M VISIT ADD ON: HCPCS | Performed by: INTERNAL MEDICINE

## 2025-03-11 PROCEDURE — G8417 CALC BMI ABV UP PARAM F/U: HCPCS | Performed by: INTERNAL MEDICINE

## 2025-03-11 PROCEDURE — 3017F COLORECTAL CA SCREEN DOC REV: CPT | Performed by: INTERNAL MEDICINE

## 2025-03-11 PROCEDURE — 1123F ACP DISCUSS/DSCN MKR DOCD: CPT | Performed by: INTERNAL MEDICINE

## 2025-03-11 PROCEDURE — 1036F TOBACCO NON-USER: CPT | Performed by: INTERNAL MEDICINE

## 2025-03-11 PROCEDURE — 99214 OFFICE O/P EST MOD 30 MIN: CPT | Performed by: INTERNAL MEDICINE

## 2025-03-11 PROCEDURE — 2022F DILAT RTA XM EVC RTNOPTHY: CPT | Performed by: INTERNAL MEDICINE

## 2025-03-11 PROCEDURE — 3051F HG A1C>EQUAL 7.0%<8.0%: CPT | Performed by: INTERNAL MEDICINE

## 2025-03-11 PROCEDURE — G8427 DOCREV CUR MEDS BY ELIG CLIN: HCPCS | Performed by: INTERNAL MEDICINE

## 2025-03-11 PROCEDURE — 1159F MED LIST DOCD IN RCRD: CPT | Performed by: INTERNAL MEDICINE

## 2025-03-11 RX ORDER — METFORMIN HYDROCHLORIDE 500 MG/1
1000 TABLET, EXTENDED RELEASE ORAL 2 TIMES DAILY WITH MEALS
Qty: 360 TABLET | Refills: 3 | Status: SHIPPED | OUTPATIENT
Start: 2025-03-11

## 2025-03-11 RX ORDER — BLOOD SUGAR DIAGNOSTIC
STRIP MISCELLANEOUS
Qty: 200 EACH | Refills: 11 | Status: SHIPPED | OUTPATIENT
Start: 2025-03-11

## 2025-03-11 RX ORDER — TIRZEPATIDE 15 MG/.5ML
15 INJECTION, SOLUTION SUBCUTANEOUS WEEKLY
Qty: 6 ML | Refills: 3 | Status: SHIPPED | OUTPATIENT
Start: 2025-03-11

## 2025-03-11 RX ORDER — TIRZEPATIDE 12.5 MG/.5ML
12.5 INJECTION, SOLUTION SUBCUTANEOUS WEEKLY
Qty: 2 ML | Refills: 0 | Status: SHIPPED | OUTPATIENT
Start: 2025-03-11 | End: 2025-04-02

## 2025-03-11 RX ORDER — PITAVASTATIN CALCIUM 2.09 MG/1
2 TABLET, FILM COATED ORAL NIGHTLY
Qty: 90 TABLET | Refills: 3 | Status: SHIPPED | OUTPATIENT
Start: 2025-03-11

## 2025-03-11 RX ORDER — ICOSAPENT ETHYL 1 G/1
2 CAPSULE ORAL 2 TIMES DAILY
Qty: 360 CAPSULE | Refills: 3 | Status: SHIPPED | OUTPATIENT
Start: 2025-03-11

## 2025-03-11 RX ORDER — EZETIMIBE 10 MG/1
10 TABLET ORAL DAILY
Qty: 90 TABLET | Refills: 3 | Status: SHIPPED | OUTPATIENT
Start: 2025-03-11

## 2025-03-11 RX ORDER — FENOFIBRATE 145 MG/1
145 TABLET, COATED ORAL DAILY
Qty: 90 TABLET | Refills: 3 | Status: SHIPPED | OUTPATIENT
Start: 2025-03-11

## 2025-03-11 RX ORDER — INSULIN GLARGINE 100 [IU]/ML
40 INJECTION, SOLUTION SUBCUTANEOUS NIGHTLY
Qty: 45 ML | Refills: 3 | Status: SHIPPED | OUTPATIENT
Start: 2025-03-11

## 2025-03-11 RX ORDER — PEN NEEDLE, DIABETIC 32GX 5/32"
NEEDLE, DISPOSABLE MISCELLANEOUS
Qty: 100 EACH | Refills: 3 | Status: SHIPPED | OUTPATIENT
Start: 2025-03-11

## 2025-03-11 RX ORDER — LANCETS 30 GAUGE
EACH MISCELLANEOUS
Qty: 200 EACH | Refills: 11 | Status: SHIPPED | OUTPATIENT
Start: 2025-03-11

## 2025-03-11 ASSESSMENT — ENCOUNTER SYMPTOMS
DIARRHEA: 0
CONSTIPATION: 0
SHORTNESS OF BREATH: 0
NAUSEA: 0
VOMITING: 0

## 2025-03-11 NOTE — PROGRESS NOTES
Angelito Marrero MD, FACE  [facility]  [address] [place] [address], [address]  [place] [address]  Phone [phone]  Facsimile [phone]          Jose Henderson is a 74 y.o. male seen 3/11/2025 for follow-up of type 2 diabetes mellitus           ASSESSMENT AND PLAN:    1. Uncontrolled type 2 diabetes mellitus with hyperglycemia, with long-term current use of insulin (HCC)  His glycemic control is suboptimal and his hemoglobin A1c is not at goal less than 7.  I will increase his Mounjaro as below and have him stop his glimepiride.  Eye exam negative 5/2023 and I instructed him to make an appointment.  Urine microalbumin negative 9/2024.      - LANTUS SOLOSTAR 100 UNIT/ML injection pen; Inject 40 Units into the skin nightly  Dispense: 45 mL; Refill: 3  - MOUNJARO 12.5 MG/0.5ML SOAJ; Inject 12.5 mg into the skin once a week for 4 doses  Dispense: 2 mL; Refill: 0  - MOUNJARO 15 MG/0.5ML SOAJ; Inject 15 mg into the skin once a week  Dispense: 6 mL; Refill: 3  - metFORMIN (GLUCOPHAGE-XR) 500 MG extended release tablet; Take 2 tablets by mouth 2 times daily (with meals)  Dispense: 360 tablet; Refill: 3  - BD PEN NEEDLE JESUS 2ND GEN 32G X 4 MM MISC; Once a day  Dispense: 100 each; Refill: 3  - ONETOUCH VERIO strip; 2 times a day  Dispense: 200 each; Refill: 11  - OneTouch Delica Lancets 30G MISC; 2 times a day  Dispense: 200 each; Refill: 11    2. Mixed hyperlipidemia  He has been intolerant of multiple statins as outlined below.  His insurance would not cover Nexletol, Nexlizet, or Repatha.  His LDL has not been at goal.  His cardiologist started him on pitavastatin and he is tolerating it.  His triglycerides remain elevated despite fenofibrate and fish oil.    - pitavastatin (LIVALO) 2 MG TABS tablet; Take 1 tablet by mouth nightly  Dispense: 90 tablet; Refill: 3  - ezetimibe (ZETIA) 10 MG tablet; Take 1 tablet by mouth daily  Dispense: 90 tablet; Refill: 3  - fenofibrate

## 2025-03-12 ENCOUNTER — PROCEDURE VISIT (OUTPATIENT)
Dept: NEUROLOGY | Age: 75
End: 2025-03-12
Payer: MEDICARE

## 2025-03-12 VITALS — HEART RATE: 70 BPM | BODY MASS INDEX: 34.58 KG/M2 | WEIGHT: 241 LBS | OXYGEN SATURATION: 96 %

## 2025-03-12 DIAGNOSIS — R20.0 NUMBNESS AND TINGLING IN BOTH HANDS: Primary | ICD-10-CM

## 2025-03-12 DIAGNOSIS — R20.2 NUMBNESS AND TINGLING IN BOTH HANDS: Primary | ICD-10-CM

## 2025-03-12 DIAGNOSIS — G56.02 SEVERE CARPAL TUNNEL SYNDROME, LEFT: ICD-10-CM

## 2025-03-12 DIAGNOSIS — G56.01 RIGHT CARPAL TUNNEL SYNDROME: ICD-10-CM

## 2025-03-12 PROCEDURE — 95913 NRV CNDJ TEST 13/> STUDIES: CPT | Performed by: PSYCHIATRY & NEUROLOGY

## 2025-03-12 PROCEDURE — 95885 MUSC TST DONE W/NERV TST LIM: CPT | Performed by: PSYCHIATRY & NEUROLOGY

## 2025-03-12 NOTE — PROGRESS NOTES
EMG/Nerve Conduction Study Procedure Note  [address]    [address]  [place]  [address]   [phone]      Hx:    Exam:     74 y.o. mw  male     EMG::   BUE     CTS features.   No atrophy.  No Pedro.  Diabetic.  Left worse.  Referring:    Dr Mittal   Technologist ::   Kelly Monreal  Hgt:      5'10\"         Summary      needle EMG selected upper extremity muscles with CV studies.                Controlled environmental factors / EMG lab.  Temperature.   NCV : sensory segments:    Abnormal = markedly abnormal = ABSENT/no response of left median distal SCV SNAP.  Slowed right median distal SCV with attenuated SNAP.  Normal bilateral ulnar bilateral radial SCV.        NCV transcarpal sensory segments:   Abnormal = slowed bilateral median transcarpal SCV worse on the left with peak difference of latency on the left at 2.23 ms (UL = 0.20 ms); right side 0.90 ms.       NCV Motor MCV segments:        Abnormal = bilateral median TL prolonged = = left median TL 8.31 ms (UL = 4.15 ms) with marked attenuation of the CMAP and slow proximal velocities at the elbow = = superimposed on Destin-Ramandeep anastomosis.  Right side median TL 4.58 ms with attenuated CMAP.  Normal bilateral ulnar MCV.  F-wave studies:         Abnormal = prolonged bilateral median F waves = left side is markedly prolonged right side is minimally prolonged.  Normal bilateral ulnar F waves.       NEEDLE EMG:   Tested muscles::      Normal bilateral FDI APB and also left ADM.              INTERPRETATION:      ELECTROPHYSIOLOGIC EVIDENCE OF SEVERE LEFT ENTRAPMENT OF THE MEDIAN NERVE AS WELL AS MODERATE RIGHT SIDE MEDIAN NERVE ENTRAPMENT AS WELL.  NO OTHER NEUROPATHY.  NO AXONAL DENERVATION.      CONCLUSION:      Severe left carpal tunnel syndrome.     *Moderately severe right carpal tunnel syndrome.  Superimposed on left Destin-Ramandeep anastomosis.           Procedure Details:       Correlates with clinical history examination for CTS.    Patient

## 2025-03-14 NOTE — PROGRESS NOTES
Orthopaedic Hand Surgery Note    Name: Jose Henderson  YOB: 1950  Gender: male  MRN: [id number]    CC: New patient referred for hand pain      HPI: Patient is a 74 y.o. male with a chief complaint of left thumb pain and weakness and knot in the palm of the hand. The patient complains of increased pain with activities involving pinch and  (ex/ opening jars, turning car key, buttons). He also complains that his hand goes to sleep when he is not moving it      ROS/Meds/PSH/PMH/FH/SH: I personally reviewed the patients standard intake form.  Pertinents are discussed in the HPI    Physical Examination:  Musculoskeletal:   Examination of the left upper extremity demonstrates normal sensation in median, ulnar and radial distribution, cap refill in all fingers < 5 seconds, negative carpal tunnel compression and Phalen test.  Mild prominence and instability of the base of first metacarpal. CMC grind is positive for pain and crepitus. Thumb MCP joint hyperextends 0 degrees. No pain at the radial styloid. There are dupuytren's cords in the palm in line with the thumb, without significant mcp or pip contracture    Imaging / Electrodiagnostic Tests:     Hand XR: AP, Lateral, Oblique and Thumb CMC joint     Clinical Indication:  1. Left hand pain    2. Left carpal tunnel syndrome    3. Dupuytren's disease    4. Arthritis of carpometacarpal (CMC) joint of left thumb           Report: AP, lateral, oblique and thumb CMC joint x-ray of the left hand demonstrates moderate joint space narrowing, subluxation and osteophytic changes of the trapezium consistent with osteoarthritis of the thumb CMC joint.      Impression: as above     Mary Mittal MD         Assessment:     ICD-10-CM    1. Left hand pain  M79.642 XR HAND LEFT (MIN 3 VIEWS)      2. Left carpal tunnel syndrome  G56.02 Ambulatory referral to Neurology      3. Dupuytren's disease  M72.0       4. Arthritis of carpometacarpal (CMC) joint of left

## 2025-03-20 ENCOUNTER — OFFICE VISIT (OUTPATIENT)
Dept: UROLOGY | Age: 75
End: 2025-03-20

## 2025-03-20 DIAGNOSIS — R39.15 URGENCY OF MICTURITION: Primary | ICD-10-CM

## 2025-03-20 LAB
BILIRUBIN, URINE, POC: NEGATIVE
BLOOD URINE, POC: NORMAL
GLUCOSE URINE, POC: NEGATIVE MG/DL
KETONES, URINE, POC: NEGATIVE MG/DL
LEUKOCYTE ESTERASE, URINE, POC: NORMAL
NITRITE, URINE, POC: NEGATIVE
PH, URINE, POC: 6 (ref 4.6–8)
PROTEIN,URINE, POC: NEGATIVE MG/DL
SPECIFIC GRAVITY, URINE, POC: 1.01 (ref 1–1.03)
URINALYSIS CLARITY, POC: NORMAL
URINALYSIS COLOR, POC: NORMAL
UROBILINOGEN, POC: NORMAL MG/DL

## 2025-03-20 RX ORDER — TOLTERODINE 2 MG/1
2 CAPSULE, EXTENDED RELEASE ORAL DAILY
Qty: 30 CAPSULE | Refills: 3 | Status: SHIPPED | OUTPATIENT
Start: 2025-03-20

## 2025-03-20 RX ORDER — GLIMEPIRIDE 1 MG/1
TABLET ORAL
COMMUNITY
Start: 2025-03-17

## 2025-03-20 ASSESSMENT — ENCOUNTER SYMPTOMS
CONSTIPATION: 0
SKIN LESIONS: 0
NAUSEA: 0
VOMITING: 0
ABDOMINAL PAIN: 0
WHEEZING: 0
INDIGESTION: 0
EYE PAIN: 0
SHORTNESS OF BREATH: 0
BACK PAIN: 0
COUGH: 0
EYE DISCHARGE: 0
BLOOD IN STOOL: 0
DIARRHEA: 0
HEARTBURN: 0

## 2025-03-20 NOTE — PROGRESS NOTES
easy work of breathing  ABDOMEN: soft, non tender  NEUROLOGIC: cranial nerves 2-12 grossly intact       Assessment and Plan    ICD-10-CM    1. Urgency of micturition  R39.15 AMB POC URINALYSIS DIP STICK AUTO W/O MICRO     tolterodine (DETROL LA) 2 MG extended release capsule          We discussed UU, UF, OAB.  Options were discussed.  He will begin detrol LA 2 mg daily.  Potential side effects were discussed.  He could try myrbetriq IF he has side effects.  I will see him back in 6 mo to reassess.

## 2025-05-05 ENCOUNTER — TELEPHONE (OUTPATIENT)
Dept: ORTHOPEDIC SURGERY | Age: 75
End: 2025-05-05

## 2025-05-05 NOTE — TELEPHONE ENCOUNTER
He had a nerve study done in mid march and never heard back and he does need surgery. Please call.

## 2025-05-05 NOTE — TELEPHONE ENCOUNTER
I called and spoke with patient, got him scheduled to see Dr. Mittal on 5/22/2025 at 12:50 p.m.  Patient voiced understanding.

## 2025-05-22 ENCOUNTER — OFFICE VISIT (OUTPATIENT)
Age: 75
End: 2025-05-22
Payer: MEDICARE

## 2025-05-22 DIAGNOSIS — G56.03 CARPAL TUNNEL SYNDROME, BILATERAL: Primary | ICD-10-CM

## 2025-05-22 PROCEDURE — 99214 OFFICE O/P EST MOD 30 MIN: CPT | Performed by: ORTHOPAEDIC SURGERY

## 2025-05-22 PROCEDURE — G8428 CUR MEDS NOT DOCUMENT: HCPCS | Performed by: ORTHOPAEDIC SURGERY

## 2025-05-22 PROCEDURE — 1036F TOBACCO NON-USER: CPT | Performed by: ORTHOPAEDIC SURGERY

## 2025-05-22 PROCEDURE — G8417 CALC BMI ABV UP PARAM F/U: HCPCS | Performed by: ORTHOPAEDIC SURGERY

## 2025-05-22 PROCEDURE — 3017F COLORECTAL CA SCREEN DOC REV: CPT | Performed by: ORTHOPAEDIC SURGERY

## 2025-05-22 PROCEDURE — 1123F ACP DISCUSS/DSCN MKR DOCD: CPT | Performed by: ORTHOPAEDIC SURGERY

## 2025-05-22 NOTE — PROGRESS NOTES
Orthopaedic Hand Surgery Note    Name: Jose Henderson  YOB: 1950  Gender: male  MRN: [id number]    CC: Follow up of hand numbness    HPI: Patient is a 74 y.o. male who is here regarding follow up for  hand numbness and tingling.  He said his symptoms have resolved after having a chiropractor work on his neck.  He is here for review of his nerve conduction study.    ROS/Meds/PSH/PMH/FH/SH: I personally reviewed the patients standard intake form.  Pertinents are discussed in the HPI    Physical Examination:  Musculoskeletal:   Examination of the left upper extremity demonstrates normal sensation in median, ulnar and radial distribution, cap refill in all fingers < 5 seconds, negative carpal tunnel compression and Phalen test.  Mild prominence and instability of the base of first metacarpal. CMC grind is positive for pain and crepitus. Thumb MCP joint hyperextends 0 degrees. No pain at the radial styloid. There are dupuytren's cords in the palm in line with the thumb, without significant mcp or pip contracture     Imaging / Electrodiagnostic Tests:     I independently reviewed and interpreted the patient's nerve conduction study.  He has findings consistent with severe left and moderate right carpal tunnel syndrome.  Left median sensory conduction response is not recordable.  Right median motor latency is 3.75 ms.  Amplitude is 8.5 µV.  Left median motor latency is 8.31 ms, amplitude is 3.4 millivolts.  Right median motor latency is 4.58 ms.    Assessment:     ICD-10-CM    1. Carpal tunnel syndrome, bilateral  G56.03           Plan:  We discussed the diagnosis and different treatment options. We discussed observation, EMG/NCV, night splinting, cortisone injections and surgical decompression and the risks and benefits of all were clearly outlined. We discussed that carpal tunnel syndrome is a progressive condition, which at its most severe stages can cause permanent sensory dysfunction, motor

## 2025-05-27 ENCOUNTER — OFFICE VISIT (OUTPATIENT)
Dept: INTERNAL MEDICINE CLINIC | Facility: CLINIC | Age: 75
End: 2025-05-27
Payer: MEDICARE

## 2025-05-27 VITALS
BODY MASS INDEX: 34.07 KG/M2 | OXYGEN SATURATION: 96 % | DIASTOLIC BLOOD PRESSURE: 81 MMHG | RESPIRATION RATE: 18 BRPM | WEIGHT: 238 LBS | HEART RATE: 69 BPM | SYSTOLIC BLOOD PRESSURE: 152 MMHG | TEMPERATURE: 97.4 F | HEIGHT: 70 IN

## 2025-05-27 DIAGNOSIS — M54.12 CERVICAL RADICULOPATHY: ICD-10-CM

## 2025-05-27 DIAGNOSIS — S29.011A STRAIN OF RIGHT PECTORALIS MUSCLE, INITIAL ENCOUNTER: ICD-10-CM

## 2025-05-27 DIAGNOSIS — M54.2 NECK PAIN ON RIGHT SIDE: Primary | ICD-10-CM

## 2025-05-27 PROCEDURE — 1159F MED LIST DOCD IN RCRD: CPT | Performed by: NURSE PRACTITIONER

## 2025-05-27 PROCEDURE — 1123F ACP DISCUSS/DSCN MKR DOCD: CPT | Performed by: NURSE PRACTITIONER

## 2025-05-27 PROCEDURE — 1036F TOBACCO NON-USER: CPT | Performed by: NURSE PRACTITIONER

## 2025-05-27 PROCEDURE — 99213 OFFICE O/P EST LOW 20 MIN: CPT | Performed by: NURSE PRACTITIONER

## 2025-05-27 PROCEDURE — 3017F COLORECTAL CA SCREEN DOC REV: CPT | Performed by: NURSE PRACTITIONER

## 2025-05-27 PROCEDURE — 1160F RVW MEDS BY RX/DR IN RCRD: CPT | Performed by: NURSE PRACTITIONER

## 2025-05-27 PROCEDURE — G8427 DOCREV CUR MEDS BY ELIG CLIN: HCPCS | Performed by: NURSE PRACTITIONER

## 2025-05-27 PROCEDURE — G8417 CALC BMI ABV UP PARAM F/U: HCPCS | Performed by: NURSE PRACTITIONER

## 2025-05-27 RX ORDER — NAPROXEN 500 MG/1
500 TABLET ORAL 2 TIMES DAILY WITH MEALS
Qty: 14 TABLET | Refills: 0 | Status: SHIPPED | OUTPATIENT
Start: 2025-05-27 | End: 2025-06-03

## 2025-05-27 RX ORDER — TIZANIDINE 2 MG/1
2 TABLET ORAL NIGHTLY PRN
Qty: 30 TABLET | Refills: 0 | Status: SHIPPED | OUTPATIENT
Start: 2025-05-27

## 2025-05-27 RX ORDER — PREDNISONE 10 MG/1
10 TABLET ORAL DAILY
Qty: 7 TABLET | Refills: 0 | Status: SHIPPED | OUTPATIENT
Start: 2025-05-27 | End: 2025-06-03

## 2025-05-27 ASSESSMENT — ENCOUNTER SYMPTOMS
ABDOMINAL PAIN: 0
COUGH: 0
SHORTNESS OF BREATH: 0
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
## 2025-05-30 ENCOUNTER — OFFICE VISIT (OUTPATIENT)
Age: 75
End: 2025-05-30

## 2025-05-30 VITALS
SYSTOLIC BLOOD PRESSURE: 149 MMHG | DIASTOLIC BLOOD PRESSURE: 76 MMHG | HEIGHT: 70 IN | WEIGHT: 238 LBS | HEART RATE: 72 BPM | BODY MASS INDEX: 34.07 KG/M2

## 2025-05-30 DIAGNOSIS — E78.5 DYSLIPIDEMIA: Primary | ICD-10-CM

## 2025-05-30 DIAGNOSIS — R06.09 DOE (DYSPNEA ON EXERTION): ICD-10-CM

## 2025-05-30 ASSESSMENT — ENCOUNTER SYMPTOMS
HEMOPTYSIS: 0
NAUSEA: 0
BLURRED VISION: 0
SHORTNESS OF BREATH: 0
BLOATING: 0
ABDOMINAL PAIN: 0
COUGH: 0
ORTHOPNEA: 0
DOUBLE VISION: 0
VOMITING: 0
BACK PAIN: 0

## 2025-05-30 NOTE — PROGRESS NOTES
[facility] CARDIOLOGY  [address] [place] [address], [address]  [place] [address]  PHONE: [phone]    25    NAME:  Jose Henderson  : 1950  MRN: [id number]         SUBJECTIVE:   Jose Henderson is a 74 y.o. male seen for a visit regarding the following:     Chief Complaint   Patient presents with    Hyperlipidemia       HPI:      Prior hx of DM, HLP, SREE on CPAP. No Fhx of pCAD. Recent negative cardiolyte (); also echo with preserved EF (GIIDD; ).   Prior hx of hypertriglycerides (improved to 194-; LDL 74; prior trig from 899 to 684; ; prior A1C at 8.6; ).  Negative Cardiolite stress test from 2023; echo from 10/26/2023 with preserved EF and no noted wall motion abnormality; normal diastolic function    States improved MURPHY; states usually better when cooler weather; still does deer hunting trip walking miles with no issues.  Some shoulder pain with an injury and currently noted as well which has been driving his blood pressures.  Tolerating current statin therapy.      Prior--No issue since last visit.  Not seen by me since 2019 and was seen by evaluation by Dr. Franco for increased dyspnea 2023 and stress test ordered as stated above.  States mostly dyspnea on exertion with inclines.  Was prescribed Repatha at his last evaluation but insurance coverage issues; per endocrine notes, also coverage issues with bempedoic acid.  During my last evaluation from , was tolerating fenofibrate/Crestor.    Prior--Tolerating fenofibrate/crestor. Well controlled home BPs and <130/80.   Since stopping gemfibrozil, prior issues of leg heaviness with ambulation resolved.   Also with mild dyspnea with exertion-appears stable and gets better per pt with prolonged activity.     HLP-controlled, SREE-controlled, HTN-controlled, dyspnea-stable, DM-not controlled    Past Medical History, Past Surgical History, Family history, Social History, and Medications were all reviewed with the

## 2025-06-06 ENCOUNTER — HOSPITAL ENCOUNTER (OUTPATIENT)
Dept: MRI IMAGING | Age: 75
Discharge: HOME | End: 2025-06-08
Payer: MEDICARE

## 2025-06-06 DIAGNOSIS — M54.12 CERVICAL RADICULOPATHY: ICD-10-CM

## 2025-06-06 PROCEDURE — 72141 MRI NECK SPINE W/O DYE: CPT

## 2025-06-10 ENCOUNTER — TELEPHONE (OUTPATIENT)
Dept: INTERNAL MEDICINE CLINIC | Facility: CLINIC | Age: 75
End: 2025-06-10

## 2025-06-10 NOTE — TELEPHONE ENCOUNTER
Pt would like to speak with someone regarding MRI results and the next course of action.     Call back #[phone]

## 2025-06-11 ENCOUNTER — TELEPHONE (OUTPATIENT)
Dept: INTERNAL MEDICINE CLINIC | Facility: CLINIC | Age: 75
End: 2025-06-11

## 2025-06-11 NOTE — TELEPHONE ENCOUNTER
Pt came by and said he had called on 6/10 to see what the next course of action is from his MRI that he had done.  He went on MyChart and it said he had already spoken to  but he has not spoken to anyone about it yet.      He would like a call back letting him know what he needs to do next.    # [phone]

## 2025-06-13 ENCOUNTER — RESULTS FOLLOW-UP (OUTPATIENT)
Dept: INTERNAL MEDICINE CLINIC | Facility: CLINIC | Age: 75
End: 2025-06-13

## 2025-06-13 DIAGNOSIS — M54.12 CERVICAL RADICULOPATHY: Primary | ICD-10-CM

## 2025-06-13 NOTE — TELEPHONE ENCOUNTER
Know this is a duplicate message but customer came back in again today 6/13/25 and requested that we send another note about the results from his MRI.  He said he is losing strength in his hand now from the issue with his neck.      # [phone]

## 2025-06-14 DIAGNOSIS — R39.15 URGENCY OF MICTURITION: ICD-10-CM

## 2025-06-15 DIAGNOSIS — E11.65 TYPE 2 DIABETES MELLITUS WITH HYPERGLYCEMIA (HCC): ICD-10-CM

## 2025-06-15 RX ORDER — TOLTERODINE 2 MG/1
CAPSULE, EXTENDED RELEASE ORAL DAILY
Qty: 90 CAPSULE | Refills: 1 | Status: SHIPPED | OUTPATIENT
Start: 2025-06-15

## 2025-06-16 RX ORDER — GLIMEPIRIDE 1 MG/1
1 TABLET ORAL 2 TIMES DAILY WITH MEALS
Qty: 180 TABLET | Refills: 3 | OUTPATIENT
Start: 2025-06-16

## 2025-06-17 DIAGNOSIS — M54.2 NECK PAIN: Primary | ICD-10-CM

## 2025-06-18 DIAGNOSIS — M54.12 CERVICAL RADICULOPATHY: ICD-10-CM

## 2025-06-18 RX ORDER — TIZANIDINE 2 MG/1
TABLET ORAL
Qty: 90 TABLET | Refills: 1 | Status: SHIPPED | OUTPATIENT
Start: 2025-06-18

## 2025-06-24 ENCOUNTER — HOSPITAL ENCOUNTER (OUTPATIENT)
Dept: GENERAL RADIOLOGY | Age: 75
Discharge: HOME | End: 2025-06-26
Payer: MEDICARE

## 2025-06-24 ENCOUNTER — OFFICE VISIT (OUTPATIENT)
Dept: NEUROSURGERY | Age: 75
End: 2025-06-24
Payer: MEDICARE

## 2025-06-24 VITALS
HEIGHT: 70 IN | OXYGEN SATURATION: 96 % | HEART RATE: 65 BPM | TEMPERATURE: 97.8 F | WEIGHT: 238 LBS | DIASTOLIC BLOOD PRESSURE: 71 MMHG | BODY MASS INDEX: 34.07 KG/M2 | SYSTOLIC BLOOD PRESSURE: 149 MMHG

## 2025-06-24 DIAGNOSIS — M54.12 CERVICAL RADICULOPATHY: ICD-10-CM

## 2025-06-24 DIAGNOSIS — M54.2 NECK PAIN: ICD-10-CM

## 2025-06-24 DIAGNOSIS — M50.20 CERVICAL DISC HERNIATION: ICD-10-CM

## 2025-06-24 DIAGNOSIS — M47.9 SPONDYLOSIS: Primary | ICD-10-CM

## 2025-06-24 PROCEDURE — G8417 CALC BMI ABV UP PARAM F/U: HCPCS | Performed by: STUDENT IN AN ORGANIZED HEALTH CARE EDUCATION/TRAINING PROGRAM

## 2025-06-24 PROCEDURE — 1036F TOBACCO NON-USER: CPT | Performed by: STUDENT IN AN ORGANIZED HEALTH CARE EDUCATION/TRAINING PROGRAM

## 2025-06-24 PROCEDURE — 1125F AMNT PAIN NOTED PAIN PRSNT: CPT | Performed by: STUDENT IN AN ORGANIZED HEALTH CARE EDUCATION/TRAINING PROGRAM

## 2025-06-24 PROCEDURE — 3017F COLORECTAL CA SCREEN DOC REV: CPT | Performed by: STUDENT IN AN ORGANIZED HEALTH CARE EDUCATION/TRAINING PROGRAM

## 2025-06-24 PROCEDURE — 1123F ACP DISCUSS/DSCN MKR DOCD: CPT | Performed by: STUDENT IN AN ORGANIZED HEALTH CARE EDUCATION/TRAINING PROGRAM

## 2025-06-24 PROCEDURE — 72040 X-RAY EXAM NECK SPINE 2-3 VW: CPT

## 2025-06-24 PROCEDURE — 1159F MED LIST DOCD IN RCRD: CPT | Performed by: STUDENT IN AN ORGANIZED HEALTH CARE EDUCATION/TRAINING PROGRAM

## 2025-06-24 PROCEDURE — 99204 OFFICE O/P NEW MOD 45 MIN: CPT | Performed by: STUDENT IN AN ORGANIZED HEALTH CARE EDUCATION/TRAINING PROGRAM

## 2025-06-24 PROCEDURE — G8427 DOCREV CUR MEDS BY ELIG CLIN: HCPCS | Performed by: STUDENT IN AN ORGANIZED HEALTH CARE EDUCATION/TRAINING PROGRAM

## 2025-06-24 RX ORDER — GABAPENTIN 300 MG/1
300 CAPSULE ORAL 3 TIMES DAILY
Qty: 270 CAPSULE | Refills: 1 | Status: SHIPPED | OUTPATIENT
Start: 2025-06-24 | End: 2025-12-21

## 2025-06-24 NOTE — PROGRESS NOTES
[facility]    Name: Jose Henderson  YOB: 1950  Gender: male  MRN: [id number]  Age: 75 y.o.    Chief Complaint: Neck and radiating upper extremity pain.    History of present illness:    This is a very pleasant 75 y.o. male who presents with a 3-month history of neck pain and radiation primarily to the right shoulder and upper extremity.  The onset of the symptoms was rather sudden about 3 months ago while patient was working on his car.. The quality of the pain is described as a deep ache in the neck and shoulder area with intermittent sharp and shooting sensations down his arm into his index and middle finger.  A tingling sensation is over the C6 distribution including the lateral neck, anterior shoulder and arm, anterior forearm, and thumb. and C7 distribution including the lateral neck, posterior arm, lateral elbow, radial forearm, and index and middle fingers. There is also some associated pain in the periscapular area.  The symptoms seem to be aggravated by lying down and activities in general but nothing seems to relieve the pain.   Thus far, efforts to address the pain have included  chiropractic care and hot and cold application.           Red Flag Signs Acute  (Days) Subacute  (Weeks) Chronic  (Months) Absent   Incontinence  []  []  []  [x]   Severe Weakness  []  []  []  [x]   Severe Sensory Loss  []  []  []  [x]   Gait Disturbance  []  []  []  [x]              Past Medical History:   Diagnosis Date    Chest pain 03/28/2016    Arm heaviness with ambulation     Dyspnea     Hyperlipidemia     Hypertriglyceridemia 07/21/2016    Kidney stones     Myalgia 04/25/2016    Neck pain     SREE on CPAP     Type 2 diabetes mellitus (HCC)     Avg FBS 70-90, hypo s/sx <70, last A1c 7.0 (8/18/22) oral and injectables    Umbilical hernia without obstruction and without gangrene 12/22/2022    Robotic umbilical hernia repair with mesh.  Daniel Medley Jr, MD 1/20/23.       Past

## 2025-07-29 ENCOUNTER — OFFICE VISIT (OUTPATIENT)
Dept: ENDOCRINOLOGY | Age: 75
End: 2025-07-29
Payer: MEDICARE

## 2025-07-29 VITALS
SYSTOLIC BLOOD PRESSURE: 142 MMHG | OXYGEN SATURATION: 96 % | DIASTOLIC BLOOD PRESSURE: 76 MMHG | WEIGHT: 235 LBS | HEIGHT: 70 IN | RESPIRATION RATE: 18 BRPM | BODY MASS INDEX: 33.64 KG/M2 | HEART RATE: 70 BPM

## 2025-07-29 DIAGNOSIS — E11.65 CONTROLLED TYPE 2 DIABETES MELLITUS WITH HYPERGLYCEMIA, WITH LONG-TERM CURRENT USE OF INSULIN (HCC): Primary | ICD-10-CM

## 2025-07-29 DIAGNOSIS — Z79.4 CONTROLLED TYPE 2 DIABETES MELLITUS WITH HYPERGLYCEMIA, WITH LONG-TERM CURRENT USE OF INSULIN (HCC): Primary | ICD-10-CM

## 2025-07-29 DIAGNOSIS — E78.2 MIXED HYPERLIPIDEMIA: ICD-10-CM

## 2025-07-29 LAB — HBA1C MFR BLD: 6.3 %

## 2025-07-29 PROCEDURE — G8427 DOCREV CUR MEDS BY ELIG CLIN: HCPCS | Performed by: INTERNAL MEDICINE

## 2025-07-29 PROCEDURE — 2022F DILAT RTA XM EVC RTNOPTHY: CPT | Performed by: INTERNAL MEDICINE

## 2025-07-29 PROCEDURE — 3017F COLORECTAL CA SCREEN DOC REV: CPT | Performed by: INTERNAL MEDICINE

## 2025-07-29 PROCEDURE — 3044F HG A1C LEVEL LT 7.0%: CPT | Performed by: INTERNAL MEDICINE

## 2025-07-29 PROCEDURE — 83036 HEMOGLOBIN GLYCOSYLATED A1C: CPT | Performed by: INTERNAL MEDICINE

## 2025-07-29 PROCEDURE — G8417 CALC BMI ABV UP PARAM F/U: HCPCS | Performed by: INTERNAL MEDICINE

## 2025-07-29 PROCEDURE — 1123F ACP DISCUSS/DSCN MKR DOCD: CPT | Performed by: INTERNAL MEDICINE

## 2025-07-29 PROCEDURE — G2211 COMPLEX E/M VISIT ADD ON: HCPCS | Performed by: INTERNAL MEDICINE

## 2025-07-29 PROCEDURE — 1036F TOBACCO NON-USER: CPT | Performed by: INTERNAL MEDICINE

## 2025-07-29 PROCEDURE — 1159F MED LIST DOCD IN RCRD: CPT | Performed by: INTERNAL MEDICINE

## 2025-07-29 PROCEDURE — 99214 OFFICE O/P EST MOD 30 MIN: CPT | Performed by: INTERNAL MEDICINE

## 2025-07-29 RX ORDER — METFORMIN HYDROCHLORIDE 500 MG/1
1000 TABLET, EXTENDED RELEASE ORAL 2 TIMES DAILY WITH MEALS
Qty: 360 TABLET | Refills: 3 | Status: SHIPPED | OUTPATIENT
Start: 2025-07-29

## 2025-07-29 RX ORDER — INSULIN GLARGINE 100 [IU]/ML
40 INJECTION, SOLUTION SUBCUTANEOUS NIGHTLY
Qty: 45 ML | Refills: 3 | Status: SHIPPED | OUTPATIENT
Start: 2025-07-29

## 2025-07-29 RX ORDER — EZETIMIBE 10 MG/1
10 TABLET ORAL DAILY
Qty: 90 TABLET | Refills: 3 | Status: SHIPPED | OUTPATIENT
Start: 2025-07-29

## 2025-07-29 RX ORDER — PITAVASTATIN CALCIUM 2.09 MG/1
2 TABLET, FILM COATED ORAL NIGHTLY
Qty: 90 TABLET | Refills: 3 | Status: SHIPPED | OUTPATIENT
Start: 2025-07-29

## 2025-07-29 RX ORDER — LANCETS 30 GAUGE
EACH MISCELLANEOUS
Qty: 200 EACH | Refills: 3 | Status: SHIPPED | OUTPATIENT
Start: 2025-07-29

## 2025-07-29 RX ORDER — TIRZEPATIDE 15 MG/.5ML
15 INJECTION, SOLUTION SUBCUTANEOUS WEEKLY
Qty: 6 ML | Refills: 3 | Status: SHIPPED | OUTPATIENT
Start: 2025-07-29

## 2025-07-29 RX ORDER — PEN NEEDLE, DIABETIC 32GX 5/32"
NEEDLE, DISPOSABLE MISCELLANEOUS
Qty: 100 EACH | Refills: 3 | Status: SHIPPED | OUTPATIENT
Start: 2025-07-29

## 2025-07-29 RX ORDER — FENOFIBRATE 145 MG/1
145 TABLET, FILM COATED ORAL DAILY
Qty: 90 TABLET | Refills: 3 | Status: SHIPPED | OUTPATIENT
Start: 2025-07-29

## 2025-07-29 RX ORDER — ICOSAPENT ETHYL 1 G/1
2 CAPSULE ORAL 2 TIMES DAILY
Qty: 360 CAPSULE | Refills: 3 | Status: SHIPPED | OUTPATIENT
Start: 2025-07-29

## 2025-07-29 RX ORDER — BLOOD SUGAR DIAGNOSTIC
STRIP MISCELLANEOUS
Qty: 200 EACH | Refills: 11 | Status: SHIPPED | OUTPATIENT
Start: 2025-07-29

## 2025-07-29 ASSESSMENT — ENCOUNTER SYMPTOMS
DIARRHEA: 0
VOMITING: 0
SHORTNESS OF BREATH: 0
NAUSEA: 0
CONSTIPATION: 0

## 2025-07-29 NOTE — PROGRESS NOTES
Angelito Marrero MD, FACE  [facility]  [address] [place] [address], [address]  [place] [address]  Phone [phone]  Facsimile [phone]          Jose Henderson is a 75 y.o. male seen 7/29/2025 for follow-up of type 2 diabetes mellitus           ASSESSMENT AND PLAN:    1. Controlled type 2 diabetes mellitus with hyperglycemia, with long-term current use of insulin (HCC)  His glycemic control has improved and his hemoglobin A1c is not at goal less than 7.  He was supposed to stop his glimepiride last visit but he continued to take it.  I will have him stop his glimepiride.  Eye exam negative 5/2023 and I instructed him to make an appointment.  Urine microalbumin negative 9/2024.  Update labs prior to next visit.    - LANTUS SOLOSTAR 100 UNIT/ML injection pen; Inject 40 Units into the skin nightly  Dispense: 45 mL; Refill: 3  - MOUNJARO 15 MG/0.5ML SOAJ pen; Inject 15 mg into the skin once a week  Dispense: 6 mL; Refill: 3  - metFORMIN (GLUCOPHAGE-XR) 500 MG extended release tablet; Take 2 tablets by mouth 2 times daily (with meals)  Dispense: 360 tablet; Refill: 3  - BD PEN NEEDLE JESUS 2ND GEN 32G X 4 MM MISC; Once a day  Dispense: 100 each; Refill: 3  - ONETOUCH VERIO strip; 2 times a day  Dispense: 200 each; Refill: 11  - Lancets (ONETOUCH DELICA PLUS OAWBPI17Q) MISC; 2 times a day  Dispense: 200 each; Refill: 3    2. Mixed hyperlipidemia  He has been intolerant of multiple statins as outlined below.  His insurance would not cover Nexletol, Nexlizet, or Repatha.  His LDL has not been at goal.  His cardiologist started him on pitavastatin and he is tolerating it.  His triglycerides remain elevated despite fenofibrate and fish oil.    - pitavastatin (LIVALO) 2 MG TABS tablet; Take 1 tablet by mouth nightly  Dispense: 90 tablet; Refill: 3  - ezetimibe (ZETIA) 10 MG tablet; Take 1 tablet by mouth daily  Dispense: 90 tablet; Refill: 3  - fenofibrate (TRICOR) 145 MG tablet;

## 2025-08-22 ENCOUNTER — OFFICE VISIT (OUTPATIENT)
Dept: INTERNAL MEDICINE CLINIC | Facility: CLINIC | Age: 75
End: 2025-08-22
Payer: MEDICARE

## 2025-08-22 VITALS
WEIGHT: 231.8 LBS | OXYGEN SATURATION: 96 % | DIASTOLIC BLOOD PRESSURE: 76 MMHG | SYSTOLIC BLOOD PRESSURE: 158 MMHG | RESPIRATION RATE: 16 BRPM | HEART RATE: 66 BPM | TEMPERATURE: 97.3 F | HEIGHT: 70 IN | BODY MASS INDEX: 33.18 KG/M2

## 2025-08-22 DIAGNOSIS — Z12.11 COLON CANCER SCREENING: ICD-10-CM

## 2025-08-22 DIAGNOSIS — E11.65 CONTROLLED TYPE 2 DIABETES MELLITUS WITH HYPERGLYCEMIA, WITH LONG-TERM CURRENT USE OF INSULIN (HCC): Primary | ICD-10-CM

## 2025-08-22 DIAGNOSIS — I10 PRIMARY HYPERTENSION: ICD-10-CM

## 2025-08-22 DIAGNOSIS — E78.2 MIXED HYPERLIPIDEMIA: ICD-10-CM

## 2025-08-22 DIAGNOSIS — Z79.4 CONTROLLED TYPE 2 DIABETES MELLITUS WITH HYPERGLYCEMIA, WITH LONG-TERM CURRENT USE OF INSULIN (HCC): Primary | ICD-10-CM

## 2025-08-22 PROCEDURE — G8417 CALC BMI ABV UP PARAM F/U: HCPCS | Performed by: INTERNAL MEDICINE

## 2025-08-22 PROCEDURE — 2022F DILAT RTA XM EVC RTNOPTHY: CPT | Performed by: INTERNAL MEDICINE

## 2025-08-22 PROCEDURE — 1036F TOBACCO NON-USER: CPT | Performed by: INTERNAL MEDICINE

## 2025-08-22 PROCEDURE — 99213 OFFICE O/P EST LOW 20 MIN: CPT | Performed by: INTERNAL MEDICINE

## 2025-08-22 PROCEDURE — 3078F DIAST BP <80 MM HG: CPT | Performed by: INTERNAL MEDICINE

## 2025-08-22 PROCEDURE — 1160F RVW MEDS BY RX/DR IN RCRD: CPT | Performed by: INTERNAL MEDICINE

## 2025-08-22 PROCEDURE — 1126F AMNT PAIN NOTED NONE PRSNT: CPT | Performed by: INTERNAL MEDICINE

## 2025-08-22 PROCEDURE — 1123F ACP DISCUSS/DSCN MKR DOCD: CPT | Performed by: INTERNAL MEDICINE

## 2025-08-22 PROCEDURE — 1159F MED LIST DOCD IN RCRD: CPT | Performed by: INTERNAL MEDICINE

## 2025-08-22 PROCEDURE — 3077F SYST BP >= 140 MM HG: CPT | Performed by: INTERNAL MEDICINE

## 2025-08-22 PROCEDURE — G8427 DOCREV CUR MEDS BY ELIG CLIN: HCPCS | Performed by: INTERNAL MEDICINE

## 2025-08-22 PROCEDURE — 3044F HG A1C LEVEL LT 7.0%: CPT | Performed by: INTERNAL MEDICINE

## 2025-08-22 PROCEDURE — 3017F COLORECTAL CA SCREEN DOC REV: CPT | Performed by: INTERNAL MEDICINE

## 2025-08-22 ASSESSMENT — PATIENT HEALTH QUESTIONNAIRE - PHQ9
2. FEELING DOWN, DEPRESSED OR HOPELESS: NOT AT ALL
SUM OF ALL RESPONSES TO PHQ QUESTIONS 1-9: 0
1. LITTLE INTEREST OR PLEASURE IN DOING THINGS: NOT AT ALL

## (undated) DEVICE — CANNULA SEAL

## (undated) DEVICE — SUTURE SZ 0 27IN 5/8 CIR UR-6  TAPER PT VIOLET ABSRB VICRYL J603H

## (undated) DEVICE — TROCAR: Brand: KII FIOS FIRST ENTRY

## (undated) DEVICE — SUTURE DEV SZ 2-0 WND CLSR ABSRB GS-22 VLOC COVIDIEN VLOCM2145

## (undated) DEVICE — ADHESIVE SKIN CLSR 0.7ML TOP DERMBND ADV

## (undated) DEVICE — SUTURE MCRYL SZ 4-0 L27IN ABSRB UD L19MM PS-2 1/2 CIR PRIM Y426H

## (undated) DEVICE — GOWN,REINFORCED,POLY,AURORA,XXLARGE,STR: Brand: MEDLINE

## (undated) DEVICE — TUBING INSUFFLATION SMK EVAC HI FLO SET PNEUMOCLEAR

## (undated) DEVICE — STERILE LATEX POWDER FREE SURGICAL GLOVES WITH HYDROGEL COATING: Brand: PROTEXIS

## (undated) DEVICE — ELECTRO LUBE IS A SINGLE PATIENT USE DEVICE THAT IS INTENDED TO BE USED ON ELECTROSURGICAL ELECTRODES TO REDUCE STICKING.: Brand: KEY SURGICAL ELECTRO LUBE

## (undated) DEVICE — ARM DRAPE

## (undated) DEVICE — MARYLAND BIPOLAR FORCEPS: Brand: ENDOWRIST

## (undated) DEVICE — SUTURE V-LOC 180 SZ 0 L9IN ABSRB GRN GS-21 L37MM 1/2 CIR VLOCL0346

## (undated) DEVICE — SUTURE V-LOC 180 SZ 0 L12IN ABSRB GRN L37MM GS-21 1/2 CIR VLOCL0316

## (undated) DEVICE — COVER MPLR TIP CRV SCIS ACC DA VINCI

## (undated) DEVICE — BLADELESS OBTURATOR: Brand: WECK VISTA

## (undated) DEVICE — TROCAR: Brand: KII® SLEEVE

## (undated) DEVICE — NEEDLE HYPO 25GA L1.5IN BVL ORIENTED ECLIPSE

## (undated) DEVICE — COLUMN DRAPE

## (undated) DEVICE — ANCHOR TISSUE RETRIEVAL SYSTEM, BAG SIZE 125 ML, PORT SIZE 8 MM: Brand: ANCHOR TISSUE RETRIEVAL SYSTEM

## (undated) DEVICE — SUTURE V LOC 1 L18IN NONABSORBABLE GS-21 TAPERPOINT NDL VLOCN0327

## (undated) DEVICE — GENERAL LAPAROSCOPY: Brand: MEDLINE INDUSTRIES, INC.